# Patient Record
Sex: FEMALE | Race: WHITE | NOT HISPANIC OR LATINO | Employment: OTHER | URBAN - METROPOLITAN AREA
[De-identification: names, ages, dates, MRNs, and addresses within clinical notes are randomized per-mention and may not be internally consistent; named-entity substitution may affect disease eponyms.]

---

## 2023-02-16 PROBLEM — R07.89 DISCOMFORT OF CHEST WALL: Status: ACTIVE | Noted: 2023-02-16

## 2023-02-16 PROBLEM — L98.9 SKIN LESION OF BACK: Status: ACTIVE | Noted: 2023-02-16

## 2023-02-16 PROBLEM — M70.31 BURSITIS OF RIGHT ELBOW: Status: ACTIVE | Noted: 2023-02-16

## 2023-02-16 PROBLEM — M79.672 FOOT PAIN, BILATERAL: Status: ACTIVE | Noted: 2023-02-16

## 2023-02-16 PROBLEM — B35.1 ONYCHOMYCOSIS: Status: ACTIVE | Noted: 2023-02-16

## 2023-02-16 PROBLEM — R53.1 WEAKNESS: Status: ACTIVE | Noted: 2023-02-16

## 2023-02-16 PROBLEM — C44.311 BASAL CELL CARCINOMA OF NOSE: Status: ACTIVE | Noted: 2023-02-16

## 2023-02-16 PROBLEM — B35.3 TINEA PEDIS: Status: ACTIVE | Noted: 2023-02-16

## 2023-02-16 PROBLEM — L60.0 ONYCHOCRYPTOSIS: Status: ACTIVE | Noted: 2023-02-16

## 2023-02-16 PROBLEM — L30.9 DERMATITIS: Status: ACTIVE | Noted: 2023-02-16

## 2023-02-16 PROBLEM — M25.561 PAIN IN JOINT INVOLVING RIGHT LOWER LEG: Status: ACTIVE | Noted: 2023-02-16

## 2023-02-16 PROBLEM — K64.4 EXTERNAL HEMORRHOID: Status: ACTIVE | Noted: 2023-02-16

## 2023-02-16 PROBLEM — R09.81 NASAL SINUS CONGESTION: Status: ACTIVE | Noted: 2023-02-16

## 2023-02-16 PROBLEM — E03.9 HYPOTHYROIDISM: Status: ACTIVE | Noted: 2023-02-16

## 2023-02-16 PROBLEM — R06.2 WHEEZING: Status: ACTIVE | Noted: 2023-02-16

## 2023-02-16 PROBLEM — L08.9 SKIN INFECTION: Status: ACTIVE | Noted: 2023-02-16

## 2023-02-16 PROBLEM — Z86.79 HISTORY OF VENTRICULAR TACHYCARDIA: Status: ACTIVE | Noted: 2023-02-16

## 2023-02-16 PROBLEM — M25.649 STIFFNESS OF HAND JOINT: Status: ACTIVE | Noted: 2023-02-16

## 2023-02-16 PROBLEM — E78.5 HYPERLIPIDEMIA: Status: ACTIVE | Noted: 2023-02-16

## 2023-02-16 PROBLEM — H61.21 IMPACTED CERUMEN OF RIGHT EAR: Status: ACTIVE | Noted: 2023-02-16

## 2023-02-16 PROBLEM — R19.4 CHANGE IN BOWEL HABITS: Status: ACTIVE | Noted: 2023-02-16

## 2023-02-16 PROBLEM — L03.90 CELLULITIS: Status: ACTIVE | Noted: 2023-02-16

## 2023-02-16 PROBLEM — J01.90 ACUTE BACTERIAL SINUSITIS: Status: ACTIVE | Noted: 2023-02-16

## 2023-02-16 PROBLEM — M20.40 HAMMER TOE: Status: ACTIVE | Noted: 2023-02-16

## 2023-02-16 PROBLEM — M25.561 CHRONIC PAIN OF BOTH KNEES: Status: ACTIVE | Noted: 2023-02-16

## 2023-02-16 PROBLEM — H61.20 CERUMEN IMPACTION: Status: ACTIVE | Noted: 2023-02-16

## 2023-02-16 PROBLEM — R10.9 ABDOMINAL PAIN: Status: ACTIVE | Noted: 2023-02-16

## 2023-02-16 PROBLEM — B96.89 ACUTE BACTERIAL SINUSITIS: Status: ACTIVE | Noted: 2023-02-16

## 2023-02-16 PROBLEM — M79.671 FOOT PAIN, BILATERAL: Status: ACTIVE | Noted: 2023-02-16

## 2023-02-16 PROBLEM — M85.80 OSTEOPENIA: Status: ACTIVE | Noted: 2023-02-16

## 2023-02-16 PROBLEM — H61.23 BILATERAL IMPACTED CERUMEN: Status: ACTIVE | Noted: 2023-02-16

## 2023-02-16 PROBLEM — R07.81 RIB PAIN ON RIGHT SIDE: Status: ACTIVE | Noted: 2023-02-16

## 2023-02-16 PROBLEM — R03.0 ELEVATED BLOOD PRESSURE READING: Status: ACTIVE | Noted: 2023-02-16

## 2023-02-16 PROBLEM — G89.29 CHRONIC PAIN OF BOTH KNEES: Status: ACTIVE | Noted: 2023-02-16

## 2023-02-16 PROBLEM — M25.562 CHRONIC PAIN OF BOTH KNEES: Status: ACTIVE | Noted: 2023-02-16

## 2023-02-16 PROBLEM — C44.90 SKIN CANCER: Status: ACTIVE | Noted: 2023-02-16

## 2023-02-16 PROBLEM — F41.9 ANXIETY: Status: ACTIVE | Noted: 2023-02-16

## 2023-02-16 PROBLEM — L85.3 XEROSIS CUTIS: Status: ACTIVE | Noted: 2023-02-16

## 2023-02-16 PROBLEM — R92.8 ABNORMAL MAMMOGRAM: Status: ACTIVE | Noted: 2023-02-16
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
## 2023-02-16 PROBLEM — M79.673 PAIN IN FOOT: Status: ACTIVE | Noted: 2023-02-16

## 2023-02-16 PROBLEM — M67.471 GANGLION CYST OF RIGHT FOOT: Status: ACTIVE | Noted: 2023-02-16

## 2023-02-16 PROBLEM — E55.9 VITAMIN D DEFICIENCY: Status: ACTIVE | Noted: 2023-02-16

## 2023-02-16 PROBLEM — J01.90 ACUTE SINUSITIS: Status: ACTIVE | Noted: 2023-02-16

## 2023-02-16 PROBLEM — R73.9 ELEVATED BLOOD SUGAR LEVEL: Status: ACTIVE | Noted: 2023-02-16

## 2023-02-16 PROBLEM — M54.32 SCIATICA OF LEFT SIDE: Status: ACTIVE | Noted: 2023-02-16

## 2023-02-16 PROBLEM — N63.11 MASS OF UPPER OUTER QUADRANT OF RIGHT BREAST: Status: ACTIVE | Noted: 2023-02-16

## 2023-02-16 PROBLEM — L60.1 ONYCHOLYSIS: Status: ACTIVE | Noted: 2023-02-16

## 2023-02-16 PROBLEM — R07.89 LEFT-SIDED CHEST WALL PAIN: Status: ACTIVE | Noted: 2023-02-16

## 2023-02-16 PROBLEM — R14.0 ABDOMINAL BLOATING: Status: ACTIVE | Noted: 2023-02-16

## 2023-02-16 PROBLEM — L60.8 SPLINTER HEMORRHAGE: Status: ACTIVE | Noted: 2023-02-16

## 2023-02-16 PROBLEM — S80.11XA CONTUSION OF RIGHT LOWER LEG: Status: ACTIVE | Noted: 2023-02-16

## 2023-02-16 PROBLEM — R53.83 FATIGUE: Status: ACTIVE | Noted: 2023-02-16

## 2023-02-16 PROBLEM — R94.31 ABNORMAL EKG: Status: ACTIVE | Noted: 2023-02-16

## 2023-02-16 PROBLEM — C18.9 COLON ADENOCARCINOMA (MULTI): Status: ACTIVE | Noted: 2023-02-16

## 2023-02-16 PROBLEM — M54.2 NECK PAIN: Status: ACTIVE | Noted: 2023-02-16

## 2023-02-16 PROBLEM — D36.9 TUBULAR ADENOMA: Status: ACTIVE | Noted: 2023-02-16

## 2023-02-16 PROBLEM — G62.9 PERIPHERAL NEUROPATHY: Status: ACTIVE | Noted: 2023-02-16

## 2023-02-16 PROBLEM — R07.81 RIB PAIN ON LEFT SIDE: Status: ACTIVE | Noted: 2023-02-16

## 2023-02-16 PROBLEM — L40.9 PSORIASIS: Status: ACTIVE | Noted: 2023-02-16

## 2023-02-16 RX ORDER — AMMONIUM LACTATE 12 G/100G
LOTION TOPICAL
COMMUNITY
Start: 2022-02-15

## 2023-02-16 RX ORDER — ATORVASTATIN CALCIUM 40 MG/1
1 TABLET, FILM COATED ORAL DAILY
COMMUNITY
Start: 2014-08-13 | End: 2023-08-29 | Stop reason: SDUPTHER

## 2023-02-16 RX ORDER — GLUCOSAMINE/CHONDROITIN/C/MANG 500-400 MG
CAPSULE ORAL
COMMUNITY
Start: 2020-10-08

## 2023-02-16 RX ORDER — ACETAMINOPHEN 500 MG
TABLET ORAL
COMMUNITY

## 2023-02-16 RX ORDER — MELOXICAM 15 MG/1
TABLET ORAL AS NEEDED
COMMUNITY
Start: 2022-04-12 | End: 2023-05-03

## 2023-02-16 RX ORDER — SOD SULF/POT CHLORIDE/MAG SULF 1.479 G
TABLET ORAL
COMMUNITY
Start: 2022-06-27 | End: 2023-05-03

## 2023-02-16 RX ORDER — LEVOTHYROXINE SODIUM 112 UG/1
1 TABLET ORAL DAILY
COMMUNITY
Start: 2016-09-08 | End: 2023-12-19 | Stop reason: SDUPTHER

## 2023-02-16 RX ORDER — CHOLECALCIFEROL (VITAMIN D3) 50 MCG
1 TABLET ORAL DAILY
COMMUNITY
Start: 2018-11-13 | End: 2023-05-03

## 2023-03-29 ENCOUNTER — APPOINTMENT (OUTPATIENT)
Dept: PRIMARY CARE | Facility: CLINIC | Age: 73
End: 2023-03-29
Payer: MEDICARE

## 2023-05-03 ENCOUNTER — OFFICE VISIT (OUTPATIENT)
Dept: PRIMARY CARE | Facility: CLINIC | Age: 73
End: 2023-05-03
Payer: MEDICARE

## 2023-05-03 VITALS
DIASTOLIC BLOOD PRESSURE: 82 MMHG | SYSTOLIC BLOOD PRESSURE: 138 MMHG | OXYGEN SATURATION: 97 % | HEART RATE: 103 BPM | BODY MASS INDEX: 29.69 KG/M2 | RESPIRATION RATE: 18 BRPM | TEMPERATURE: 97.3 F | WEIGHT: 152 LBS

## 2023-05-03 DIAGNOSIS — H92.01 EAR PAIN, RIGHT: Primary | ICD-10-CM

## 2023-05-03 DIAGNOSIS — H61.23 EXCESSIVE CERUMEN IN BOTH EAR CANALS: ICD-10-CM

## 2023-05-03 PROCEDURE — 1159F MED LIST DOCD IN RCRD: CPT | Performed by: INTERNAL MEDICINE

## 2023-05-03 PROCEDURE — 1036F TOBACCO NON-USER: CPT | Performed by: INTERNAL MEDICINE

## 2023-05-03 PROCEDURE — 99213 OFFICE O/P EST LOW 20 MIN: CPT | Performed by: INTERNAL MEDICINE

## 2023-05-03 ASSESSMENT — PATIENT HEALTH QUESTIONNAIRE - PHQ9
SUM OF ALL RESPONSES TO PHQ9 QUESTIONS 1 AND 2: 0
2. FEELING DOWN, DEPRESSED OR HOPELESS: NOT AT ALL
1. LITTLE INTEREST OR PLEASURE IN DOING THINGS: NOT AT ALL

## 2023-05-03 ASSESSMENT — PAIN SCALES - GENERAL: PAINLEVEL: 0-NO PAIN

## 2023-05-03 NOTE — PATIENT INSTRUCTIONS
Plan   Pl irrigate R ear for wax in it. - checked after irrigation . Only slight wax came out. She will try with ear wax removal drops for 5 more days.   Consider ENT ref if symptoms persist after that  Patient felt satisfied with plan  F/U sept for Medicare wellness.

## 2023-05-03 NOTE — PROGRESS NOTES
My nurse note reviewed. Patient is here for:  ESV (Patient voiced c/o right ear pain off and on for several months.)       C/o pain in R ear for few months , more so for few weeks. Tried ear wax drops few weeks ago and also used peroxide.   Patient denies any , fever, chills, sore throat, nasal or sinus congestion or cough . No chest pain or shortness of breath.    OBJECTIVE :  /82 (BP Location: Left arm, Patient Position: Sitting)   Pulse 103   Temp 36.3 °C (97.3 °F)   Resp 18   Wt 68.9 kg (152 lb)   SpO2 97%   BMI 29.69 kg/m²   There is soft wax in both ears, no redness noted . TM is not visualized due to wax.   Neck exam didnot reveal any cervical LN enlargement  Passive neck movements were normal. No pain over TMJ noted    Assessment:  Ear pain   Cerumen ears     Plan   Pl irrigate R ear for wax in it. - checked after irrigation . Only slight wax came out. She will try with ear wax removal drops for 5 more days.   Consider ENT ref if symptoms persist after that  Patient felt satisfied with plan  F/U sept for Medicare wellness.

## 2023-05-17 DIAGNOSIS — H92.09 OTALGIA, UNSPECIFIED LATERALITY: ICD-10-CM

## 2023-05-17 DIAGNOSIS — H61.21 IMPACTED CERUMEN OF RIGHT EAR: Primary | ICD-10-CM

## 2023-08-24 PROBLEM — H92.01 RIGHT EAR PAIN: Status: ACTIVE | Noted: 2023-08-24

## 2023-08-24 PROBLEM — L29.9 EAR ITCHING: Status: ACTIVE | Noted: 2023-08-24

## 2023-08-24 PROBLEM — M25.562 ACUTE PAIN OF LEFT KNEE: Status: ACTIVE | Noted: 2023-08-24

## 2023-08-24 PROBLEM — R20.0 NUMBNESS OF TOES: Status: ACTIVE | Noted: 2023-08-24

## 2023-08-24 PROBLEM — H61.22 IMPACTED CERUMEN OF LEFT EAR: Status: ACTIVE | Noted: 2023-08-24

## 2023-08-29 ENCOUNTER — OFFICE VISIT (OUTPATIENT)
Dept: PRIMARY CARE | Facility: CLINIC | Age: 73
End: 2023-08-29
Payer: MEDICARE

## 2023-08-29 VITALS
RESPIRATION RATE: 18 BRPM | BODY MASS INDEX: 29.64 KG/M2 | WEIGHT: 151 LBS | SYSTOLIC BLOOD PRESSURE: 138 MMHG | TEMPERATURE: 96.6 F | HEART RATE: 87 BPM | DIASTOLIC BLOOD PRESSURE: 76 MMHG | OXYGEN SATURATION: 98 % | HEIGHT: 60 IN

## 2023-08-29 DIAGNOSIS — E03.9 HYPOTHYROIDISM, UNSPECIFIED TYPE: ICD-10-CM

## 2023-08-29 DIAGNOSIS — E78.5 HYPERLIPIDEMIA, UNSPECIFIED HYPERLIPIDEMIA TYPE: Primary | ICD-10-CM

## 2023-08-29 PROCEDURE — 99213 OFFICE O/P EST LOW 20 MIN: CPT | Performed by: INTERNAL MEDICINE

## 2023-08-29 PROCEDURE — 1159F MED LIST DOCD IN RCRD: CPT | Performed by: INTERNAL MEDICINE

## 2023-08-29 PROCEDURE — 1036F TOBACCO NON-USER: CPT | Performed by: INTERNAL MEDICINE

## 2023-08-29 PROCEDURE — 1170F FXNL STATUS ASSESSED: CPT | Performed by: INTERNAL MEDICINE

## 2023-08-29 PROCEDURE — 1126F AMNT PAIN NOTED NONE PRSNT: CPT | Performed by: INTERNAL MEDICINE

## 2023-08-29 RX ORDER — ATORVASTATIN CALCIUM 40 MG/1
40 TABLET, FILM COATED ORAL DAILY
Qty: 90 TABLET | Refills: 3 | Status: SHIPPED | OUTPATIENT
Start: 2023-08-29

## 2023-08-29 ASSESSMENT — ACTIVITIES OF DAILY LIVING (ADL)
DOING_HOUSEWORK: INDEPENDENT
MANAGING_FINANCES: INDEPENDENT
GROCERY_SHOPPING: INDEPENDENT
BATHING: INDEPENDENT
DRESSING: INDEPENDENT
TAKING_MEDICATION: INDEPENDENT

## 2023-08-29 ASSESSMENT — PATIENT HEALTH QUESTIONNAIRE - PHQ9
1. LITTLE INTEREST OR PLEASURE IN DOING THINGS: NOT AT ALL
SUM OF ALL RESPONSES TO PHQ9 QUESTIONS 1 AND 2: 0
2. FEELING DOWN, DEPRESSED OR HOPELESS: NOT AT ALL

## 2023-08-29 ASSESSMENT — PAIN SCALES - GENERAL: PAINLEVEL: 0-NO PAIN

## 2023-08-29 NOTE — PROGRESS NOTES
My nurse note reviewed. Patient is here for:  For f/U on thyroid, HLD    Patient denies any shortness of breath, PND, orthopnea, chest pain , palpitation, syncope or edema in legs  Taking meds ok     Needs refill  OBJECTIVE :/76 (BP Location: Left arm, Patient Position: Sitting, BP Cuff Size: Adult)   Pulse 87   Temp 35.9 °C (96.6 °F)   Resp 18   Ht 1.524 m (5')   Wt 68.5 kg (151 lb)   SpO2 98%   BMI 29.49 kg/m²   CVS: S1 S2 + , no S3. No loud heart murmur appreciated. Lungs clear, No edema  CNS: Patient is alert, oriented moving all 4 extremities well. No motor weakness noted on gross neurological exam. No involuntary movements or tremors noted.    Assessment:  1. Hyperlipidemia, unspecified hyperlipidemia type - refill done   2. Hypothyroidism, unspecified type -hx of. On med     Plan   Current medications are effective. advised to continue current medications.  Requested prescription/s refill done to the pharmacy of patient choice .  Schedule for Medicare wellness on Sept 8th , Friday .   Patient felt satisfied with plan

## 2023-08-29 NOTE — PATIENT INSTRUCTIONS
Plan   Current medications are effective. advised to continue current medications.  Requested prescription/s refill done to the pharmacy of patient choice .  Schedule for Medicare wellness on Sept 8th , Friday .   Patient felt satisfied with plan

## 2023-09-08 ENCOUNTER — OFFICE VISIT (OUTPATIENT)
Dept: PRIMARY CARE | Facility: CLINIC | Age: 73
End: 2023-09-08
Payer: MEDICARE

## 2023-09-08 VITALS
OXYGEN SATURATION: 96 % | HEART RATE: 96 BPM | BODY MASS INDEX: 29.25 KG/M2 | HEIGHT: 60 IN | WEIGHT: 149 LBS | TEMPERATURE: 95.9 F | DIASTOLIC BLOOD PRESSURE: 78 MMHG | RESPIRATION RATE: 18 BRPM | SYSTOLIC BLOOD PRESSURE: 126 MMHG

## 2023-09-08 DIAGNOSIS — Z13.6 SCREENING FOR CARDIOVASCULAR CONDITION: ICD-10-CM

## 2023-09-08 DIAGNOSIS — Z23 ENCOUNTER FOR IMMUNIZATION: ICD-10-CM

## 2023-09-08 DIAGNOSIS — Z78.0 POSTMENOPAUSAL: ICD-10-CM

## 2023-09-08 DIAGNOSIS — Z13.39 ALCOHOL SCREENING: ICD-10-CM

## 2023-09-08 DIAGNOSIS — H10.31 ACUTE CONJUNCTIVITIS OF RIGHT EYE, UNSPECIFIED ACUTE CONJUNCTIVITIS TYPE: ICD-10-CM

## 2023-09-08 DIAGNOSIS — Z13.31 DEPRESSION SCREEN: ICD-10-CM

## 2023-09-08 DIAGNOSIS — Z00.00 MEDICARE ANNUAL WELLNESS VISIT, SUBSEQUENT: Primary | ICD-10-CM

## 2023-09-08 PROCEDURE — 1036F TOBACCO NON-USER: CPT | Performed by: INTERNAL MEDICINE

## 2023-09-08 PROCEDURE — 1170F FXNL STATUS ASSESSED: CPT | Performed by: INTERNAL MEDICINE

## 2023-09-08 PROCEDURE — G0008 ADMIN INFLUENZA VIRUS VAC: HCPCS | Performed by: INTERNAL MEDICINE

## 2023-09-08 PROCEDURE — G0446 INTENS BEHAVE THER CARDIO DX: HCPCS | Performed by: INTERNAL MEDICINE

## 2023-09-08 PROCEDURE — G0442 ANNUAL ALCOHOL SCREEN 15 MIN: HCPCS | Performed by: INTERNAL MEDICINE

## 2023-09-08 PROCEDURE — 99213 OFFICE O/P EST LOW 20 MIN: CPT | Performed by: INTERNAL MEDICINE

## 2023-09-08 PROCEDURE — G0439 PPPS, SUBSEQ VISIT: HCPCS | Performed by: INTERNAL MEDICINE

## 2023-09-08 PROCEDURE — 90715 TDAP VACCINE 7 YRS/> IM: CPT | Performed by: INTERNAL MEDICINE

## 2023-09-08 PROCEDURE — 90662 IIV NO PRSV INCREASED AG IM: CPT | Performed by: INTERNAL MEDICINE

## 2023-09-08 PROCEDURE — 1159F MED LIST DOCD IN RCRD: CPT | Performed by: INTERNAL MEDICINE

## 2023-09-08 PROCEDURE — 99497 ADVNCD CARE PLAN 30 MIN: CPT | Performed by: INTERNAL MEDICINE

## 2023-09-08 PROCEDURE — 1126F AMNT PAIN NOTED NONE PRSNT: CPT | Performed by: INTERNAL MEDICINE

## 2023-09-08 PROCEDURE — G0444 DEPRESSION SCREEN ANNUAL: HCPCS | Performed by: INTERNAL MEDICINE

## 2023-09-08 RX ORDER — TOBRAMYCIN 3 MG/ML
2 SOLUTION/ DROPS OPHTHALMIC 2 TIMES DAILY
Qty: 1.4 ML | Refills: 0 | Status: SHIPPED | OUTPATIENT
Start: 2023-09-08 | End: 2023-09-15

## 2023-09-08 ASSESSMENT — PATIENT HEALTH QUESTIONNAIRE - PHQ9
SUM OF ALL RESPONSES TO PHQ9 QUESTIONS 1 AND 2: 0
1. LITTLE INTEREST OR PLEASURE IN DOING THINGS: NOT AT ALL
2. FEELING DOWN, DEPRESSED OR HOPELESS: NOT AT ALL

## 2023-09-08 ASSESSMENT — ACTIVITIES OF DAILY LIVING (ADL)
MANAGING_FINANCES: INDEPENDENT
BATHING: INDEPENDENT
DRESSING: INDEPENDENT
GROCERY_SHOPPING: INDEPENDENT
DOING_HOUSEWORK: INDEPENDENT
TAKING_MEDICATION: INDEPENDENT

## 2023-09-08 ASSESSMENT — PAIN SCALES - GENERAL: PAINLEVEL: 0-NO PAIN

## 2023-09-08 NOTE — PROGRESS NOTES
My nurse note reviewed. Patient is here for:  Medicare Annual Wellness Visit Subsequent (Patient voiced c/o of stye to right eye that has been there for about a month.)     Also wants R eye issue to be addressed, it is red , itchy and gets some pus like discharge in morning . Also sticky in morning     Over the past 2 weeks, how often have you been bothered by any of the following problems?  Little interest or pleasure in doing things: Not at all  Feeling down, depressed, or hopeless: Not at all  Functional Ability/Level of Safety  Cognitive Impairment Observed: No cognitive impairment observed  Nutrition and Exercise  Current Diet: Well Balanced Diet  Adequate Fluid Intake: Yes  Caffeine: Yes  Exercise Frequency: Regularly  IADL's  Grocery Shopping: Independent  Doing Housework: Independent  Taking Medication: Independent  Managing Finances: Independent  [place] Home Safety Risk Factors  Home Safety Risk Factors: None     Patient denies any shortness of breath, PND, orthopnea, chest pain , palpitation, syncope or edema in legs  patient denies any abdominal pain, tenderness, nausea, vomiting, change in bowel habits or blood in stool.  Patient denies any weakness in extremities.. Denies any headache, visual symptoms , speech problems or  tremors . No TIA or stroke like symptoms..   Taking meds ok     During Medicare wellness apart from looking at assessment done by nurse,  I also asked following :    Alcohol use : Alcohol screening  was  done during this visit. Patient is not having any issue with increase alcohol use . No ETOH abuse was observed by history . Occ drinks wine    HIV test: patient was not found to be high risk for HIV     Cognitive issue : None     Advanced Directive: Patient has advanced directive including living will and Health care power of . Health POA is daughter Brenda . All questions related to it answered. Total time > 16 min.     I have reviewed all active medications patient is  currently on . Questions related to medication answered to patient's satisfaction.    OBJECTIVE :  /78 (BP Location: Left arm, Patient Position: Sitting, BP Cuff Size: Adult)   Pulse 96   Temp 35.5 °C (95.9 °F)   Resp 18   Ht 1.524 m (5')   Wt 67.6 kg (149 lb)   SpO2 96%   BMI 29.10 kg/m²   R eye conjunctival congestion noted, no pus noted, seems irritated . Eye movements ok   CVS: S1 S2 + , no S3. No loud heart murmur appreciated. Lungs clear, No edema      Assessment:  1. Medicare annual wellness visit, subsequent  done      2. Alcohol screening        3. Depression screen        4. Screening for cardiovascular condition        5. Postmenopausal  DEXA      6. Encounter for immunization        7. Acute conjunctivitis of right eye, unspecified acute conjunctivitis type  New rx done      During Medicare wellness patients chronic diagnosis were reviewed and questions related to it answered to patient's satisfaction . Risk factors for heart, stroke were discussed with patient . Discussion about preventative tests were done with patient also . pain issue was discussed as appropriate for patient .  ASCVD score 12.2 %. discussed  Plan  Medicare wellness done  Eye drops prescribed for R eye problem   Warm compress   See eye doctor if not better  TDAP and Flu shot   Bone density test   F/U 6 months and 12 months  for medicare wellness

## 2023-09-08 NOTE — PATIENT INSTRUCTIONS
Plan  Medicare wellness done  Eye drops prescribed for R eye problem   Warm compress   See eye doctor if not better  TDAP and Flu shot   Bone density test   F/U 6 months and 12 months  for medicare wellness

## 2023-12-05 ENCOUNTER — OFFICE VISIT (OUTPATIENT)
Dept: PODIATRY | Facility: CLINIC | Age: 73
End: 2023-12-05
Payer: MEDICARE

## 2023-12-05 DIAGNOSIS — M79.672 FOOT PAIN, BILATERAL: Primary | ICD-10-CM

## 2023-12-05 DIAGNOSIS — B35.1 ONYCHOMYCOSIS: ICD-10-CM

## 2023-12-05 DIAGNOSIS — L60.1 ONYCHOLYSIS: ICD-10-CM

## 2023-12-05 DIAGNOSIS — M79.671 FOOT PAIN, BILATERAL: Primary | ICD-10-CM

## 2023-12-05 DIAGNOSIS — L85.3 XEROSIS CUTIS: ICD-10-CM

## 2023-12-05 PROCEDURE — 99213 OFFICE O/P EST LOW 20 MIN: CPT | Performed by: PODIATRIST

## 2023-12-05 PROCEDURE — 1126F AMNT PAIN NOTED NONE PRSNT: CPT | Performed by: PODIATRIST

## 2023-12-05 PROCEDURE — 1160F RVW MEDS BY RX/DR IN RCRD: CPT | Performed by: PODIATRIST

## 2023-12-05 PROCEDURE — 1159F MED LIST DOCD IN RCRD: CPT | Performed by: PODIATRIST

## 2023-12-05 PROCEDURE — 1036F TOBACCO NON-USER: CPT | Performed by: PODIATRIST

## 2023-12-05 NOTE — PROGRESS NOTES
Chief Complaint   Patient presents with    Nail Care     [id number]    Follow-up bilateral foot pain secondary to nail pathology. Nails are quite thickened difficulty with shoe gear. Several feel ingrown. Baseline complaint. No other new problems.no changes past medical history. No changes in review of systems.      Constitutional: Patient alert. No acute distress.  Psychiatric: Normal mood and affect.  HEENT: Sclera clear. Wearing glasses  Respiratory: Breathing unlabored.   Dermatologic: Multiple nails are hypertrophic crumbly and yellow. Mild onychocryptosis with the hallux nails without any acute inflammation noted.  Onycholysis distally hallux nails noted.  Painful to palpation. No acute inflammatory infectious process. Mild xerosis cutis noted. These are baseline findings to the patient.  No ulcers are pre-ulcerative areas in either foot.  Web spaces are dry. No tinea pedis.   Pulses intact and symmetric. Feet warm to touch. No ulcers or pre ulcer areas.   Motor function is grossly intact and symmetric.      Impression: Bilateral foot pain secondary to nail pathology. Xerosis cutis.     -The current clinical findings, treatment course, and current plan were discussed with the patient in detail. Various questions were answered at that time. If there is any interval changes or new problems the patient will advise. This dictation was done using voice recognition software and may contain some grammatical errors.      -Nail debridement was performed this was a greater than 6 nails. Nails were manually debrided. They were decreased and girth in length. Appropriate care was discussed. Preventative care was reviewed. Follow-up in about 2 months unless there is any other problems.     -Review also seasonal foot care.

## 2023-12-19 ENCOUNTER — OFFICE VISIT (OUTPATIENT)
Dept: PRIMARY CARE | Facility: CLINIC | Age: 73
End: 2023-12-19
Payer: MEDICARE

## 2023-12-19 ENCOUNTER — HOSPITAL ENCOUNTER (OUTPATIENT)
Dept: RADIOLOGY | Facility: HOSPITAL | Age: 73
Discharge: HOME | End: 2023-12-19
Payer: MEDICARE

## 2023-12-19 VITALS
OXYGEN SATURATION: 98 % | DIASTOLIC BLOOD PRESSURE: 72 MMHG | SYSTOLIC BLOOD PRESSURE: 154 MMHG | BODY MASS INDEX: 28.9 KG/M2 | WEIGHT: 148 LBS | HEART RATE: 105 BPM | TEMPERATURE: 96.1 F

## 2023-12-19 DIAGNOSIS — R10.31 RIGHT INGUINAL PAIN: ICD-10-CM

## 2023-12-19 DIAGNOSIS — C18.4 MALIGNANT NEOPLASM OF TRANSVERSE COLON (MULTI): ICD-10-CM

## 2023-12-19 DIAGNOSIS — R10.31 RIGHT INGUINAL PAIN: Primary | ICD-10-CM

## 2023-12-19 DIAGNOSIS — E03.9 HYPOTHYROIDISM, UNSPECIFIED TYPE: ICD-10-CM

## 2023-12-19 DIAGNOSIS — R03.0 ELEVATED BLOOD PRESSURE READING: ICD-10-CM

## 2023-12-19 PROCEDURE — 1126F AMNT PAIN NOTED NONE PRSNT: CPT | Performed by: INTERNAL MEDICINE

## 2023-12-19 PROCEDURE — 99214 OFFICE O/P EST MOD 30 MIN: CPT | Performed by: INTERNAL MEDICINE

## 2023-12-19 PROCEDURE — 1160F RVW MEDS BY RX/DR IN RCRD: CPT | Performed by: INTERNAL MEDICINE

## 2023-12-19 PROCEDURE — 1036F TOBACCO NON-USER: CPT | Performed by: INTERNAL MEDICINE

## 2023-12-19 PROCEDURE — 73502 X-RAY EXAM HIP UNI 2-3 VIEWS: CPT | Mod: RT,FY

## 2023-12-19 PROCEDURE — 73502 X-RAY EXAM HIP UNI 2-3 VIEWS: CPT | Mod: RIGHT SIDE | Performed by: RADIOLOGY

## 2023-12-19 PROCEDURE — 1159F MED LIST DOCD IN RCRD: CPT | Performed by: INTERNAL MEDICINE

## 2023-12-19 RX ORDER — LEVOTHYROXINE SODIUM 112 UG/1
112 TABLET ORAL DAILY
Qty: 90 TABLET | Refills: 3 | Status: CANCELLED | OUTPATIENT
Start: 2023-12-19

## 2023-12-19 RX ORDER — LEVOTHYROXINE SODIUM 112 UG/1
112 TABLET ORAL
Qty: 90 TABLET | Refills: 3 | Status: SHIPPED | OUTPATIENT
Start: 2023-12-19

## 2023-12-19 RX ORDER — MELOXICAM 15 MG/1
15 TABLET ORAL DAILY
Qty: 30 TABLET | Refills: 1 | Status: SHIPPED | OUTPATIENT
Start: 2023-12-19 | End: 2024-12-18

## 2023-12-19 ASSESSMENT — PATIENT HEALTH QUESTIONNAIRE - PHQ9
2. FEELING DOWN, DEPRESSED OR HOPELESS: NOT AT ALL
1. LITTLE INTEREST OR PLEASURE IN DOING THINGS: NOT AT ALL
SUM OF ALL RESPONSES TO PHQ9 QUESTIONS 1 AND 2: 0

## 2023-12-19 NOTE — PROGRESS NOTES
My nurse note reviewed. Patient is here for:  Pain (Right sided pain /Sensation in left leg)   Pt is here for new issue with R groin pian and sometimes in L thigh pain .   patient denies any abdominal pain, tenderness, nausea, vomiting, change in bowel habits or blood in stool.  Hx of colon CA, sees surgeon, last colonoscopy last yr , reviewed and discussed   No fall or injury   Worried about bones   Needs refill    OBJECTIVE :  /72   Pulse 105   Temp 35.6 °C (96.1 °F)   Wt 67.1 kg (148 lb)   SpO2 98%   BMI 28.90 kg/m²   Bp mildly elevated ? Due to pain   R hip movements ok   Some pain near R groin, no inguinal hernia felt, abd soft , no rebound tenderness   No pain in L leg at present       Assessment:  1. Right inguinal pain ? Hip related or other MSK pain . R/o other etiology    2. Malignant neoplasm of transverse colon (CMS/HCC) -hx of. Upto date on colonoscopy   3. Hypothyroidism, unspecified type -hx of. Refill done   4 elevated BP - will monitor it during future visit   Plan  Xray of hip ordered  Discussed causes of groin pain, questions answered  New prescription done for pain   Requested prescription/s refill done to the pharmacy of patient choice .  F/U in March or earlier if symptoms persist

## 2023-12-19 NOTE — PATIENT INSTRUCTIONS
Plan  Xray of hip ordered  Discussed causes of groin pain, questions answered  New prescription done for pain   Requested prescription/s refill done to the pharmacy of patient choice .  F/U in March or earlier if symptoms persist

## 2023-12-27 ENCOUNTER — ANCILLARY PROCEDURE (OUTPATIENT)
Dept: RADIOLOGY | Facility: CLINIC | Age: 73
End: 2023-12-27
Payer: MEDICARE

## 2023-12-27 ENCOUNTER — TELEPHONE (OUTPATIENT)
Dept: PRIMARY CARE | Facility: CLINIC | Age: 73
End: 2023-12-27

## 2023-12-27 DIAGNOSIS — Z78.0 ASYMPTOMATIC MENOPAUSAL STATE: ICD-10-CM

## 2023-12-27 DIAGNOSIS — Z12.31 ENCOUNTER FOR SCREENING MAMMOGRAM FOR MALIGNANT NEOPLASM OF BREAST: ICD-10-CM

## 2023-12-27 PROCEDURE — 77063 BREAST TOMOSYNTHESIS BI: CPT | Mod: BILATERAL PROCEDURE | Performed by: RADIOLOGY

## 2023-12-27 PROCEDURE — 77080 DXA BONE DENSITY AXIAL: CPT

## 2023-12-27 PROCEDURE — 77080 DXA BONE DENSITY AXIAL: CPT | Performed by: RADIOLOGY

## 2023-12-27 PROCEDURE — 77063 BREAST TOMOSYNTHESIS BI: CPT

## 2023-12-27 PROCEDURE — 77067 SCR MAMMO BI INCL CAD: CPT | Mod: BILATERAL PROCEDURE | Performed by: RADIOLOGY

## 2023-12-27 NOTE — PROGRESS NOTES
Patient notified of bone density results . Patient would also like mammogram and pelvis x-ray results.

## 2024-01-31 ENCOUNTER — OFFICE VISIT (OUTPATIENT)
Dept: SURGERY | Facility: CLINIC | Age: 74
End: 2024-01-31
Payer: MEDICARE

## 2024-01-31 VITALS
BODY MASS INDEX: 28.47 KG/M2 | DIASTOLIC BLOOD PRESSURE: 80 MMHG | SYSTOLIC BLOOD PRESSURE: 140 MMHG | WEIGHT: 145 LBS | TEMPERATURE: 98.4 F | HEIGHT: 60 IN | HEART RATE: 88 BPM

## 2024-01-31 DIAGNOSIS — C18.4 ADENOCARCINOMA OF TRANSVERSE COLON (MULTI): Primary | ICD-10-CM

## 2024-01-31 PROCEDURE — 99214 OFFICE O/P EST MOD 30 MIN: CPT | Performed by: SURGERY

## 2024-01-31 PROCEDURE — 1159F MED LIST DOCD IN RCRD: CPT | Performed by: SURGERY

## 2024-01-31 PROCEDURE — 1036F TOBACCO NON-USER: CPT | Performed by: SURGERY

## 2024-01-31 PROCEDURE — 1126F AMNT PAIN NOTED NONE PRSNT: CPT | Performed by: SURGERY

## 2024-01-31 NOTE — PROGRESS NOTES
History Of Present Illness :  Earlene Herr is a 73 y.o. female who presents for 6-month colon cancer follow-up.  Patient was last seen by me on 7/19/2023.  Please see the note(s) in legacy  [employer] for details.    Patient notes some chronic intermittent achiness and burning in the right lower quadrant of uncertain etiology.  This appears to be near a trocar site.  This is very intermittent and does not lifestyle changing.  Otherwise no other abdominal pain or GI symptoms.  She is noted no issues with regard to the umbilicus.      The  patient's most recent colonoscopy was on 8/17/2022 and was overall satisfactory.  A small hyperplastic polyp was noted in the descending colon.  Next colonoscopy recommended in 3 years per Dr. Mackay.      Patient lives in [place].  She is a retired .  The patient is a .  Her  passed away at home from an acute cardiac event in December 2022.    Past Medical History   Past Medical History:   Diagnosis Date    Basal cell carcinoma of skin of other part of trunk 09/15/2015    Basal cell carcinoma of flank    Dorsalgia, unspecified 09/15/2015    Chronic back pain    Encounter for gynecological examination (general) (routine) without abnormal findings     Pap smear, as part of routine gynecological examination    Pain in unspecified foot 05/19/2016    Foot pain    Person consulting for explanation of examination or test findings     Visit for review of DEXA scan    Personal history of other diseases of the musculoskeletal system and connective tissue 08/02/2017    History of osteopenia    Personal history of other endocrine, nutritional and metabolic disease 09/15/2015    History of thyroid disease    Personal history of other medical treatment     History of mammogram    Personal history of other specified (corrected) congenital malformations of nervous system and sense organs 09/15/2015    History of spina bifida    Pure hypercholesterolemia, unspecified  09/15/2015    High cholesterol        Surgical History    Past Surgical History:   Procedure Laterality Date    BREAST BIOPSY Right 10/2019    Benign right core     SECTION, CLASSIC  09/15/2015     Section    HYSTERECTOMY  09/15/2015    Hysterectomy    NOSE SURGERY  2017    Nose Surgery        Allergies   Allergies   Allergen Reactions    Codeine Unknown    Sulfa (Sulfonamide Antibiotics) Unknown    Sulfamethoxazole-Trimethoprim Unknown        Home Meds  Current Outpatient Medications   Medication Instructions    ammonium lactate (Lac-Hydrin) 12 % lotion APPLY AS DIRECTED    atorvastatin (LIPITOR) 40 mg, oral, Daily    calcium carbonate-vitamin D3 600 mg-20 mcg (800 unit) tablet Caltrate 600 Plus-Vit D TABS   Refills: 0       Active    glucosamine-chondroit-vit C-Mn 500-400 mg capsule Glucosamine-Chondroitin Oral Capsule   Refills: 0        Start : 8-Oct-2020   Active    hydrocortisone acetate 2.5 % cream with perineal applicator USE AS DIRECTED    levothyroxine (SYNTHROID, LEVOXYL) 112 mcg, oral, Daily before breakfast    meloxicam (MOBIC) 15 mg, oral, Daily, For one week then as needed        Family History    Family History   Problem Relation Name Age of Onset    Bunion Mother      Other (cardiac disorder) Mother      Stroke Mother      Other (thyroid trouble) Mother      Prostate cancer Father      Other (bowel obstruction) Brother      Other (thyroid trouble) Brother          Social History  Social History     Tobacco Use    Smoking status: Never    Smokeless tobacco: Never   Substance Use Topics    Alcohol use: Yes     Alcohol/week: 1.0 standard drink of alcohol     Types: 1 Glasses of wine per week     Comment: occassionally        Review Of Systems    Review of Systems     General: Not Present- Obesity, Cancer, HIV, MRSA, Recent Cold/Flu, Tired During the Day and VRE.  HEENT: Not Present- Migraine, Cataracts, Glaucoma, Macular Degeneration and Retinal Detachment.  Respiratory: Not  Present- Asthma, Chronic Cough, Difficulty Breathing on Exertion, Difficulty Breathing at Rest, Emphysema, Frequent Bronchitis, Home CPAP/BiPAP, Home Oxygen, Pulmonary Embolus, Pneumonia/TB, Sleep Apnea and Snoring.  Cardiovascular: Not Present- Chest Pain, Congestive Heart Failure, Heart Attack, Coronary Artery Disease, Heart Stent, Hypertension, Internal Defibrillator, Irregular Heart Beat, Mitral Valve Prolapse, Murmur, Pacemaker and Peripheral Vascular Disease.  Gastrointestinal: Not Present- Heartburn, Hepatitis, Hiatal Hernia, Jaundice, Reflux, Stomach Ulcer and IBS.  Female Genitourinary: Not Present- Kidney Failure, Kidney Stones, Dialysis and Urinary Tract Infection.  Musculoskeletal: Not Present- Arthritis, Back Pain and Fibromyalgia.  Neurological: Not Present- Headaches, Numbness, Tingling, Seizures, Stroke,  Shunt and Weakness.  Psychiatric: Not Present- Bipolar, Depression and Panic Attacks.  Endocrine: Not Present- Diabetes, Hyperthyroidism and Low Blood Sugar.  Hematology: Not Present- Abnormal Bleeding, Anemia and Blood Clots.     Vitals  There were no vitals taken for this visit.     Physical Exam   Abdominal Physical Exam     General  General Appearance - Not in acute distress. Well-developed and well-nourished. Alert and oriented times 3.     Chest and Lung Exam  Auscultation:  Breath sounds: - Normal. Clear and equal bilaterally.  Adventitious sounds: - No Adventitious sounds.     Cardiovascular  Auscultation: Rate and Rhythm - Regular. Heart Sounds - Normal heart sounds. No murmurs.     Abdomen  Inspection: - Inspection Normal.  Palpation/Percussion: Palpation and Percussion of the abdomen reveal - Non Tender, No Rebound tenderness, No Rigidity (guarding) and No Palpable abdominal masses.  Liver: - Normal.  Spleen: - Normal.  Auscultation: Auscultation of the abdomen reveals - Bowel sounds normal and No Abdominal bruits.  Surgical scars: Small midline superior to the umbilicus; old lower  midline scar; laparoscopic x3  There is some minimal weakness at the supraumbilical fascia at the site of her incision -perhaps 2.5 cm in diameter; no clear fascial defect can be palpated; no change from previous     Inguinal  No inguinal or femoral hernias or lymphadenopathy bilaterally.     Rectal  Anorectal Exam: not examined.    Assessment/Plan   Mrs. Herr's clinical follow-up is satisfactory.    Will continue to monitor her abdominal wall particularly the supraumbilical site.  There is some weakness there but no outright hernia or fascial defect.    Diagnosis:    Colon adenocarcinoma, proximal transverse colon  Stage I (T1 N0)  S/p laparoscopic extended ileocolectomy, 11/4/2021, [facility]    Recommendations:    Follow-up in 6 months for recheck    Next colonoscopy August 2025 per Dr. Mackay

## 2024-03-05 ENCOUNTER — APPOINTMENT (OUTPATIENT)
Dept: PRIMARY CARE | Facility: CLINIC | Age: 74
End: 2024-03-05
Payer: MEDICARE

## 2024-03-05 ENCOUNTER — APPOINTMENT (OUTPATIENT)
Dept: PODIATRY | Facility: CLINIC | Age: 74
End: 2024-03-05
Payer: MEDICARE

## 2024-03-27 ENCOUNTER — OFFICE VISIT (OUTPATIENT)
Dept: PRIMARY CARE | Facility: CLINIC | Age: 74
End: 2024-03-27
Payer: MEDICARE

## 2024-03-27 VITALS
SYSTOLIC BLOOD PRESSURE: 128 MMHG | DIASTOLIC BLOOD PRESSURE: 80 MMHG | BODY MASS INDEX: 28.32 KG/M2 | HEART RATE: 88 BPM | WEIGHT: 145 LBS | OXYGEN SATURATION: 96 % | TEMPERATURE: 95.4 F

## 2024-03-27 DIAGNOSIS — E03.9 HYPOTHYROIDISM, UNSPECIFIED TYPE: Primary | ICD-10-CM

## 2024-03-27 DIAGNOSIS — E78.5 HYPERLIPIDEMIA, UNSPECIFIED HYPERLIPIDEMIA TYPE: ICD-10-CM

## 2024-03-27 PROCEDURE — 1159F MED LIST DOCD IN RCRD: CPT | Performed by: INTERNAL MEDICINE

## 2024-03-27 PROCEDURE — 99213 OFFICE O/P EST LOW 20 MIN: CPT | Performed by: INTERNAL MEDICINE

## 2024-03-27 PROCEDURE — 1036F TOBACCO NON-USER: CPT | Performed by: INTERNAL MEDICINE

## 2024-03-27 NOTE — PROGRESS NOTES
My nurse note reviewed. Patient is here for:  Follow-up     Patient denies any shortness of breath, PND, orthopnea, chest pain , palpitation, syncope or edema in legs  Patient denies any weakness in extremities.. Denies any headache, visual symptoms , speech problems or  tremors . No TIA or stroke like symptoms..  Uses claritin for allergies, doing ok with it.    Takes meds ok   I have reviewed all active medications patient is currently on . Questions related to medication answered to patient's satisfaction.  Did not have blood tests last year   OBJECTIVE :  /80   Pulse 88   Temp 35.2 °C (95.4 °F)   Wt 65.8 kg (145 lb)   SpO2 96%   BMI 28.32 kg/m²   CVS: S1 S2 + , no S3. No loud heart murmur appreciated. Lungs clear, No edema  CNS: Patient is alert, oriented moving all 4 extremities well. No motor weakness noted on gross neurological exam. No involuntary movements or tremors noted.    Assessment:  1. Hypothyroidism, unspecified type -on med.    2. Hyperlipidemia, unspecified hyperlipidemia type -hx of .      Plan  Current medications are effective. advised to continue current medications.  Blood tests ordered  Questions related to blood tests answered  F/U in September as scheduled

## 2024-03-27 NOTE — PATIENT INSTRUCTIONS
Plan  Current medications are effective. advised to continue current medications.  Blood tests ordered  Questions related to blood tests answered  F/U in September as scheduled

## 2024-04-09 ENCOUNTER — PROCEDURE VISIT (OUTPATIENT)
Dept: PODIATRY | Facility: CLINIC | Age: 74
End: 2024-04-09
Payer: MEDICARE

## 2024-04-09 ENCOUNTER — LAB (OUTPATIENT)
Dept: LAB | Facility: LAB | Age: 74
End: 2024-04-09
Payer: MEDICARE

## 2024-04-09 DIAGNOSIS — M79.671 FOOT PAIN, BILATERAL: Primary | ICD-10-CM

## 2024-04-09 DIAGNOSIS — R23.8 SKIN BULLA: ICD-10-CM

## 2024-04-09 DIAGNOSIS — E03.9 HYPOTHYROIDISM, UNSPECIFIED TYPE: ICD-10-CM

## 2024-04-09 DIAGNOSIS — L60.1 ONYCHOLYSIS: ICD-10-CM

## 2024-04-09 DIAGNOSIS — E78.5 HYPERLIPIDEMIA, UNSPECIFIED HYPERLIPIDEMIA TYPE: ICD-10-CM

## 2024-04-09 DIAGNOSIS — B35.1 ONYCHOMYCOSIS: ICD-10-CM

## 2024-04-09 DIAGNOSIS — M79.672 FOOT PAIN, BILATERAL: Primary | ICD-10-CM

## 2024-04-09 LAB
ALBUMIN SERPL BCP-MCNC: 4.5 G/DL (ref 3.4–5)
ALP SERPL-CCNC: 67 U/L (ref 33–136)
ALT SERPL W P-5'-P-CCNC: 27 U/L (ref 7–45)
ANION GAP SERPL CALC-SCNC: 11 MMOL/L (ref 10–20)
AST SERPL W P-5'-P-CCNC: 20 U/L (ref 9–39)
BASOPHILS # BLD AUTO: 0.06 X10*3/UL (ref 0–0.1)
BASOPHILS NFR BLD AUTO: 0.8 %
BILIRUB DIRECT SERPL-MCNC: 0.1 MG/DL (ref 0–0.3)
BILIRUB SERPL-MCNC: 0.7 MG/DL (ref 0–1.2)
BUN SERPL-MCNC: 17 MG/DL (ref 6–23)
CALCIUM SERPL-MCNC: 9.9 MG/DL (ref 8.6–10.3)
CHLORIDE SERPL-SCNC: 105 MMOL/L (ref 98–107)
CHOLEST SERPL-MCNC: 151 MG/DL (ref 0–199)
CHOLESTEROL/HDL RATIO: 3.3
CO2 SERPL-SCNC: 31 MMOL/L (ref 21–32)
CREAT SERPL-MCNC: 0.76 MG/DL (ref 0.5–1.05)
EGFRCR SERPLBLD CKD-EPI 2021: 82 ML/MIN/1.73M*2
EOSINOPHIL # BLD AUTO: 0.2 X10*3/UL (ref 0–0.4)
EOSINOPHIL NFR BLD AUTO: 2.6 %
ERYTHROCYTE [DISTWIDTH] IN BLOOD BY AUTOMATED COUNT: 13.2 % (ref 11.5–14.5)
GLUCOSE SERPL-MCNC: 87 MG/DL (ref 74–99)
HCT VFR BLD AUTO: 46.1 % (ref 36–46)
HDLC SERPL-MCNC: 45.8 MG/DL
HGB BLD-MCNC: 15.1 G/DL (ref 12–16)
IMM GRANULOCYTES # BLD AUTO: 0.01 X10*3/UL (ref 0–0.5)
IMM GRANULOCYTES NFR BLD AUTO: 0.1 % (ref 0–0.9)
LYMPHOCYTES # BLD AUTO: 1.7 X10*3/UL (ref 0.8–3)
LYMPHOCYTES NFR BLD AUTO: 21.9 %
MCH RBC QN AUTO: 28.9 PG (ref 26–34)
MCHC RBC AUTO-ENTMCNC: 32.8 G/DL (ref 32–36)
MCV RBC AUTO: 88 FL (ref 80–100)
MONOCYTES # BLD AUTO: 0.55 X10*3/UL (ref 0.05–0.8)
MONOCYTES NFR BLD AUTO: 7.1 %
NEUTROPHILS # BLD AUTO: 5.26 X10*3/UL (ref 1.6–5.5)
NEUTROPHILS NFR BLD AUTO: 67.5 %
NON-HDL CHOLESTEROL: 105 MG/DL (ref 0–149)
NRBC BLD-RTO: 0 /100 WBCS (ref 0–0)
PLATELET # BLD AUTO: 287 X10*3/UL (ref 150–450)
POTASSIUM SERPL-SCNC: 3.8 MMOL/L (ref 3.5–5.3)
PROT SERPL-MCNC: 7.3 G/DL (ref 6.4–8.2)
RBC # BLD AUTO: 5.23 X10*6/UL (ref 4–5.2)
SODIUM SERPL-SCNC: 143 MMOL/L (ref 136–145)
TSH SERPL-ACNC: 0.35 MIU/L (ref 0.44–3.98)
WBC # BLD AUTO: 7.8 X10*3/UL (ref 4.4–11.3)

## 2024-04-09 PROCEDURE — 82248 BILIRUBIN DIRECT: CPT

## 2024-04-09 PROCEDURE — 99213 OFFICE O/P EST LOW 20 MIN: CPT | Performed by: PODIATRIST

## 2024-04-09 PROCEDURE — 80053 COMPREHEN METABOLIC PANEL: CPT

## 2024-04-09 PROCEDURE — 85025 COMPLETE CBC W/AUTO DIFF WBC: CPT

## 2024-04-09 PROCEDURE — 84443 ASSAY THYROID STIM HORMONE: CPT

## 2024-04-09 PROCEDURE — 82465 ASSAY BLD/SERUM CHOLESTEROL: CPT

## 2024-04-09 PROCEDURE — 36415 COLL VENOUS BLD VENIPUNCTURE: CPT

## 2024-04-09 PROCEDURE — 83718 ASSAY OF LIPOPROTEIN: CPT

## 2024-04-09 NOTE — PROGRESS NOTES
Chief Complaint   Patient presents with    Nail Care     Patient is here today for routine nail care     Patient has chief complaint of bilateral painful nails.  Nails are quite thickened difficulty with shoe gear. Several feel ingrown. Baseline complaint.  Also admits to blood blister fifth digit right foot.  Etiology unknown.  Perhaps mild trauma.  It has shrunk in size no drainage no further pain.  No other new problems.no changes past medical history. No changes in review of systems.      Constitutional: Patient alert. No acute distress.  Psychiatric: Normal mood and affect.  HEENT: Sclera clear. Wearing glasses  Respiratory: Breathing unlabored.   Dermatologic: Multiple nails are hypertrophic crumbly and yellow. Mild onychocryptosis with the hallux nails without any acute inflammation noted.  Onycholysis distally hallux nails noted.  Painful to palpation.  There is a small dried blood blister fifth digit right foot.  Nothing to drain from it.  No acute inflammatory infectious process. Mild xerosis cutis noted. These are baseline findings to the patient.  No ulcers are pre-ulcerative areas in either foot.  Web spaces are dry. No tinea pedis.   Pulses intact and symmetric. Feet warm to touch. No ulcers or pre ulcer areas.   Motor function is grossly intact and symmetric.      Impression: Bilateral foot pain secondary to nail pathology. Xerosis cutis.  Blood blister fifth digit right foot.     -The current clinical findings, treatment course, and current plan were discussed with the patient in detail. Various questions were answered at that time. If there is any interval changes or new problems the patient will advise. This dictation was done using voice recognition software and may contain some grammatical errors.      -Nail debridement was performed this was a greater than 6 nails. Nails were manually debrided. They were decreased and girth in length. Appropriate care was discussed. Preventative care was  reviewed. Follow-up in about 2 months unless there is any other problems.     -I expect the fifth digit dry blood blister and jumps scab off on its own.  No intervention needed.    -Review also seasonal foot care.

## 2024-06-18 ENCOUNTER — APPOINTMENT (OUTPATIENT)
Dept: PODIATRY | Facility: CLINIC | Age: 74
End: 2024-06-18
Payer: MEDICARE

## 2024-07-01 DIAGNOSIS — E78.5 HYPERLIPIDEMIA, UNSPECIFIED HYPERLIPIDEMIA TYPE: ICD-10-CM

## 2024-07-01 DIAGNOSIS — E03.9 HYPOTHYROIDISM, UNSPECIFIED TYPE: ICD-10-CM

## 2024-07-05 NOTE — TELEPHONE ENCOUNTER
Patient has concerns about thyroid. She mentioned that she is getting more tired than usual and is experiencing hair loss. Please advise

## 2024-07-08 RX ORDER — LEVOTHYROXINE SODIUM 112 UG/1
112 TABLET ORAL
Qty: 90 TABLET | Refills: 3 | Status: SHIPPED | OUTPATIENT
Start: 2024-07-08

## 2024-07-08 RX ORDER — ATORVASTATIN CALCIUM 40 MG/1
40 TABLET, FILM COATED ORAL DAILY
Qty: 90 TABLET | Refills: 3 | Status: SHIPPED | OUTPATIENT
Start: 2024-07-08

## 2024-07-11 ENCOUNTER — TELEPHONE (OUTPATIENT)
Dept: PRIMARY CARE | Facility: CLINIC | Age: 74
End: 2024-07-11
Payer: MEDICARE

## 2024-07-11 NOTE — TELEPHONE ENCOUNTER
Patient called stating that she experiencing hair loss and was wondering if an increase in her thyroid medication would be neccessary. Please advise

## 2024-07-31 ENCOUNTER — APPOINTMENT (OUTPATIENT)
Dept: SURGERY | Facility: CLINIC | Age: 74
End: 2024-07-31
Payer: MEDICARE

## 2024-07-31 VITALS
OXYGEN SATURATION: 98 % | TEMPERATURE: 97 F | WEIGHT: 143 LBS | SYSTOLIC BLOOD PRESSURE: 138 MMHG | DIASTOLIC BLOOD PRESSURE: 84 MMHG | HEIGHT: 60 IN | BODY MASS INDEX: 28.07 KG/M2 | HEART RATE: 89 BPM | RESPIRATION RATE: 17 BRPM

## 2024-07-31 DIAGNOSIS — C18.4 ADENOCARCINOMA OF TRANSVERSE COLON (MULTI): Primary | ICD-10-CM

## 2024-07-31 PROCEDURE — 3008F BODY MASS INDEX DOCD: CPT | Performed by: SURGERY

## 2024-07-31 PROCEDURE — 1159F MED LIST DOCD IN RCRD: CPT | Performed by: SURGERY

## 2024-07-31 PROCEDURE — 99213 OFFICE O/P EST LOW 20 MIN: CPT | Performed by: SURGERY

## 2024-07-31 PROCEDURE — 1036F TOBACCO NON-USER: CPT | Performed by: SURGERY

## 2024-07-31 NOTE — PROGRESS NOTES
Established Patient Visit    Earlene presents for 6-month colon cancer follow-up.    The patient has been doing quite well since her last visit.  Her right lower quadrant abdominal pain has completely resolved.  She has no abdominal pain or GI symptoms whatsoever.  She has remained in good health.      1/31/2024:  Earlene Herr is a 73 y.o. female who presents for 6-month colon cancer follow-up.  Patient was last seen by me on 7/19/2023.  Please see the note(s) in legacy  [employer] for details.     Patient notes some chronic intermittent achiness and burning in the right lower quadrant of uncertain etiology.  This appears to be near a trocar site.  This is very intermittent and does not lifestyle changing.  Otherwise no other abdominal pain or GI symptoms.  She is noted no issues with regard to the umbilicus.       The  patient's most recent colonoscopy was on 8/17/2022 and was overall satisfactory.  A small hyperplastic polyp was noted in the descending colon.  Next colonoscopy recommended in 3 years per Dr. Mackay.       Patient lives in [place].  She is a retired .  The patient is a .  Her  passed away at home from an acute cardiac event in December 2022.     Past Medical History   Medical History        Past Medical History:   Diagnosis Date    Basal cell carcinoma of skin of other part of trunk 09/15/2015     Basal cell carcinoma of flank    Dorsalgia, unspecified 09/15/2015     Chronic back pain    Encounter for gynecological examination (general) (routine) without abnormal findings       Pap smear, as part of routine gynecological examination    Pain in unspecified foot 05/19/2016     Foot pain    Person consulting for explanation of examination or test findings       Visit for review of DEXA scan    Personal history of other diseases of the musculoskeletal system and connective tissue 08/02/2017     History of osteopenia    Personal history of other endocrine, nutritional and  metabolic disease 09/15/2015     History of thyroid disease    Personal history of other medical treatment       History of mammogram    Personal history of other specified (corrected) congenital malformations of nervous system and sense organs 09/15/2015     History of spina bifida    Pure hypercholesterolemia, unspecified 09/15/2015     High cholesterol            Surgical History    Surgical History         Past Surgical History:   Procedure Laterality Date    BREAST BIOPSY Right 10/2019     Benign right core     SECTION, CLASSIC   09/15/2015      Section    HYSTERECTOMY   09/15/2015     Hysterectomy    NOSE SURGERY   2017     Nose Surgery            Allergies   RX Allergies        Allergies   Allergen Reactions    Codeine Unknown    Sulfa (Sulfonamide Antibiotics) Unknown    Sulfamethoxazole-Trimethoprim Unknown            Home Meds       Current Outpatient Medications   Medication Instructions    ammonium lactate (Lac-Hydrin) 12 % lotion APPLY AS DIRECTED    atorvastatin (LIPITOR) 40 mg, oral, Daily    calcium carbonate-vitamin D3 600 mg-20 mcg (800 unit) tablet Caltrate 600 Plus-Vit D TABS   Refills: 0        Active    glucosamine-chondroit-vit C-Mn 500-400 mg capsule Glucosamine-Chondroitin Oral Capsule   Refills: 0         Start : 8-Oct-2020   Active    hydrocortisone acetate 2.5 % cream with perineal applicator USE AS DIRECTED    levothyroxine (SYNTHROID, LEVOXYL) 112 mcg, oral, Daily before breakfast    meloxicam (MOBIC) 15 mg, oral, Daily, For one week then as needed         Family History    Family History          Family History   Problem Relation Name Age of Onset    Bunion Mother        Other (cardiac disorder) Mother        Stroke Mother        Other (thyroid trouble) Mother        Prostate cancer Father        Other (bowel obstruction) Brother        Other (thyroid trouble) Brother                Social History  Social History   Social History            Tobacco Use    Smoking  status: Never    Smokeless tobacco: Never   Substance Use Topics    Alcohol use: Yes       Alcohol/week: 1.0 standard drink of alcohol       Types: 1 Glasses of wine per week       Comment: occassionally            Review Of Systems    Review of Systems     General: Not Present- Obesity, Cancer, HIV, MRSA, Recent Cold/Flu, Tired During the Day and VRE.  HEENT: Not Present- Migraine, Cataracts, Glaucoma, Macular Degeneration and Retinal Detachment.  Respiratory: Not Present- Asthma, Chronic Cough, Difficulty Breathing on Exertion, Difficulty Breathing at Rest, Emphysema, Frequent Bronchitis, Home CPAP/BiPAP, Home Oxygen, Pulmonary Embolus, Pneumonia/TB, Sleep Apnea and Snoring.  Cardiovascular: Not Present- Chest Pain, Congestive Heart Failure, Heart Attack, Coronary Artery Disease, Heart Stent, Hypertension, Internal Defibrillator, Irregular Heart Beat, Mitral Valve Prolapse, Murmur, Pacemaker and Peripheral Vascular Disease.  Gastrointestinal: Not Present- Heartburn, Hepatitis, Hiatal Hernia, Jaundice, Reflux, Stomach Ulcer and IBS.  Female Genitourinary: Not Present- Kidney Failure, Kidney Stones, Dialysis and Urinary Tract Infection.  Musculoskeletal: Not Present- Arthritis, Back Pain and Fibromyalgia.  Neurological: Not Present- Headaches, Numbness, Tingling, Seizures, Stroke,  Shunt and Weakness.  Psychiatric: Not Present- Bipolar, Depression and Panic Attacks.  Endocrine: Not Present- Diabetes, Hyperthyroidism and Low Blood Sugar.  Hematology: Not Present- Abnormal Bleeding, Anemia and Blood Clots.      Vitals  There were no vitals taken for this visit.      Physical Exam   Abdominal Physical Exam     General  General Appearance - Not in acute distress. Well-developed and well-nourished. Alert and oriented times 3.     Chest and Lung Exam  Auscultation:  Breath sounds: - Normal. Clear and equal bilaterally.  Adventitious sounds: - No Adventitious sounds.     Cardiovascular  Auscultation: Rate and Rhythm -  Regular. Heart Sounds - Normal heart sounds. No murmurs.     Abdomen  Inspection: - Inspection Normal.  Palpation/Percussion: Palpation and Percussion of the abdomen reveal - Non Tender, No Rebound tenderness, No Rigidity (guarding) and No Palpable abdominal masses.  Liver: - Normal.  Spleen: - Normal.  Auscultation: Auscultation of the abdomen reveals - Bowel sounds normal and No Abdominal bruits.  Surgical scars: Small midline superior to the umbilicus; old lower midline scar; laparoscopic x3  There is weakness at the supraumbilical fascia at the site of her incision -perhaps 2.5 cm in diameter; there may be a small ill-defined fascial defect; no significant change from previous     Inguinal  No inguinal or femoral hernias or lymphadenopathy bilaterally.     Rectal  Anorectal Exam: not examined.     Assessment/Plan     Mrs. Herr's clinical follow-up is satisfactory.     Will continue to monitor her abdominal wall particularly the supraumbilical site.  There was some fascial weakness and possibly a very small incisional hernia at that site.  This is completely asymptomatic for her however.     Diagnosis:     Colon adenocarcinoma, proximal transverse colon  Stage I (T1 N0)  S/p laparoscopic extended ileocolectomy, 11/4/2021, [facility]     Recommendations:     Follow-up in 6 months for recheck     Next colonoscopy August 2025 per Dr. Macaky -will arrange at next visit

## 2024-09-03 ENCOUNTER — APPOINTMENT (OUTPATIENT)
Dept: PODIATRY | Facility: CLINIC | Age: 74
End: 2024-09-03
Payer: MEDICARE

## 2024-09-03 DIAGNOSIS — L85.3 XEROSIS CUTIS: ICD-10-CM

## 2024-09-03 DIAGNOSIS — R23.8 SKIN BULLA: ICD-10-CM

## 2024-09-03 DIAGNOSIS — M79.672 FOOT PAIN, BILATERAL: Primary | ICD-10-CM

## 2024-09-03 DIAGNOSIS — B35.1 ONYCHOMYCOSIS: ICD-10-CM

## 2024-09-03 DIAGNOSIS — M79.671 FOOT PAIN, BILATERAL: Primary | ICD-10-CM

## 2024-09-03 DIAGNOSIS — L60.1 ONYCHOLYSIS: ICD-10-CM

## 2024-09-03 PROCEDURE — 99213 OFFICE O/P EST LOW 20 MIN: CPT | Performed by: PODIATRIST

## 2024-09-03 NOTE — PROGRESS NOTES
Chief Complaint   Patient presents with    nail care     Patient is here today for nail care     Patient has chief complaint of bilateral painful nails.  Nails are quite thickened difficulty with shoe gear. Several feel ingrown. Baseline complaint.  Also admits to blood blister fifth digit right foot.  Etiology unknown.  Perhaps mild trauma.  It has shrunk in size no drainage no further pain.  No other new problems.no changes past medical history. No changes in review of systems.      Constitutional: Patient alert. No acute distress.  Psychiatric: Normal mood and affect.  HEENT: Sclera clear. Wearing glasses  Respiratory: Breathing unlabored.   Dermatologic: Multiple nails are hypertrophic crumbly and yellow.  They are painful.  Progressive elongation of the nails.  Mild onychocryptosis with the hallux nails without any acute inflammation noted.  Onycholysis distally hallux nails noted.  This is baseline.  Mild xerosis cutis noted. These are baseline findings to the patient.  No ulcers are pre-ulcerative areas in either foot.  Web spaces are dry. No tinea pedis.   Pulses intact and symmetric. Feet warm to touch. No ulcers or pre ulcer areas.   Motor function is grossly intact and symmetric.      Impression: Bilateral foot pain secondary to nail pathology. Xerosis cutis.  Resolved blood blister fifth digit right foot.     -The current clinical findings, treatment course, and current plan were discussed with the patient in detail. Various questions were answered at that time. If there is any interval changes or new problems the patient will advise. This dictation was done using voice recognition software and may contain some grammatical errors.      -Nail debridement was performed this was a greater than 6 nails. Nails were manually debrided. They were decreased and girth in length. Appropriate care was discussed. Preventative care was reviewed. Follow-up in about 2 months unless there is any other problems.      -Discussed seasonal footcare.

## 2024-09-04 ENCOUNTER — APPOINTMENT (OUTPATIENT)
Dept: PRIMARY CARE | Facility: CLINIC | Age: 74
End: 2024-09-04
Payer: MEDICARE

## 2024-09-05 ENCOUNTER — APPOINTMENT (OUTPATIENT)
Dept: PRIMARY CARE | Facility: CLINIC | Age: 74
End: 2024-09-05
Payer: MEDICARE

## 2024-09-09 ENCOUNTER — APPOINTMENT (OUTPATIENT)
Dept: PRIMARY CARE | Facility: CLINIC | Age: 74
End: 2024-09-09
Payer: MEDICARE

## 2024-09-09 VITALS
TEMPERATURE: 98 F | BODY MASS INDEX: 27.54 KG/M2 | SYSTOLIC BLOOD PRESSURE: 134 MMHG | WEIGHT: 141 LBS | HEART RATE: 100 BPM | OXYGEN SATURATION: 97 % | DIASTOLIC BLOOD PRESSURE: 80 MMHG

## 2024-09-09 DIAGNOSIS — Z00.00 MEDICARE ANNUAL WELLNESS VISIT, SUBSEQUENT: Primary | ICD-10-CM

## 2024-09-09 DIAGNOSIS — Z71.89 ADVANCE DIRECTIVE DISCUSSED WITH PATIENT: ICD-10-CM

## 2024-09-09 DIAGNOSIS — E03.9 HYPOTHYROIDISM, UNSPECIFIED TYPE: ICD-10-CM

## 2024-09-09 DIAGNOSIS — Z13.31 DEPRESSION SCREEN: ICD-10-CM

## 2024-09-09 DIAGNOSIS — Z12.31 ENCOUNTER FOR SCREENING MAMMOGRAM FOR BREAST CANCER: ICD-10-CM

## 2024-09-09 DIAGNOSIS — Z13.6 SCREENING FOR CARDIOVASCULAR CONDITION: ICD-10-CM

## 2024-09-09 DIAGNOSIS — E78.5 HYPERLIPIDEMIA, UNSPECIFIED HYPERLIPIDEMIA TYPE: ICD-10-CM

## 2024-09-09 PROCEDURE — 90662 IIV NO PRSV INCREASED AG IM: CPT | Performed by: INTERNAL MEDICINE

## 2024-09-09 PROCEDURE — 1170F FXNL STATUS ASSESSED: CPT | Performed by: INTERNAL MEDICINE

## 2024-09-09 PROCEDURE — G0446 INTENS BEHAVE THER CARDIO DX: HCPCS | Performed by: INTERNAL MEDICINE

## 2024-09-09 PROCEDURE — 99213 OFFICE O/P EST LOW 20 MIN: CPT | Performed by: INTERNAL MEDICINE

## 2024-09-09 PROCEDURE — G0444 DEPRESSION SCREEN ANNUAL: HCPCS | Performed by: INTERNAL MEDICINE

## 2024-09-09 PROCEDURE — 1159F MED LIST DOCD IN RCRD: CPT | Performed by: INTERNAL MEDICINE

## 2024-09-09 PROCEDURE — 99497 ADVNCD CARE PLAN 30 MIN: CPT | Performed by: INTERNAL MEDICINE

## 2024-09-09 PROCEDURE — G0439 PPPS, SUBSEQ VISIT: HCPCS | Performed by: INTERNAL MEDICINE

## 2024-09-09 PROCEDURE — 1036F TOBACCO NON-USER: CPT | Performed by: INTERNAL MEDICINE

## 2024-09-09 PROCEDURE — G0008 ADMIN INFLUENZA VIRUS VAC: HCPCS | Performed by: INTERNAL MEDICINE

## 2024-09-09 ASSESSMENT — ACTIVITIES OF DAILY LIVING (ADL)
GROCERY_SHOPPING: INDEPENDENT
DOING_HOUSEWORK: INDEPENDENT
TAKING_MEDICATION: INDEPENDENT
BATHING: INDEPENDENT
DRESSING: INDEPENDENT
MANAGING_FINANCES: INDEPENDENT

## 2024-09-09 NOTE — PROGRESS NOTES
My nurse note reviewed. Patient is here for:  Medicare Annual Wellness Visit Subsequent (Has been feeling fatigue/Neck itch)       Pt is here for medicare wellness and follow up    Patient denies any shortness of breath, PND, orthopnea, chest pain , palpitation, syncope or edema in legs  patient denies any abdominal pain, tenderness, nausea, vomiting, change in bowel habits or blood in stool.  Patient denies any weakness in extremities.. Denies any headache, visual symptoms , speech problems or  tremors . No TIA or stroke like symptoms..    Over the past 2 weeks, how often have you been bothered by any of the following problems?  Little interest or pleasure in doing things: Not at all  Feeling down, depressed, or hopeless: Not at all  Functional Ability/Level of Safety  Cognitive Impairment Observed: No cognitive impairment observed  Cognitive Impairment Reported: No cognitive impairment reported by patient or family  Nutrition and Exercise  Current Diet: Well Balanced Diet  Adequate Fluid Intake: Yes  Caffeine: No  Exercise Frequency: Infrequently  IADL's  Grocery Shopping: Independent  Doing Housework: Independent  Taking Medication: Independent  Managing Finances: Independent  [place] Home Safety Risk Factors  Home Safety Risk Factors: None     Taking meds ok     During Medicare wellness apart from looking at assessment done by nurse,  I also asked following :    Alcohol use : Alcohol screening  was  done during this visit. Patient is not having any issue with increase alcohol use . No ETOH abuse was observed by history . Rarely drinks .    Depression screen:  > 5 minutes  were spent screening for depression using PHQ2/PHQ9 as documented in the chart.    HIV test: patient was not found to be high risk for HIV     Cognitive issue : None     Advanced Directive: Patient has advanced directive including living will and Health care power of . Health POA is Daughter , Tyler . All questions related to it  answered. Total time > 16 min.     I have reviewed all active medications patient is currently on . Questions related to medication answered to patient's satisfaction.    During office visit today patient's chronic diagnosis were reviewed and questions related to it answered to patient's satisfaction . Risk factors for heart, stroke were discussed with patient . Discussion about preventative tests were done with patient also . pain issue was discussed as appropriate for patient . I plan to follow up patient's chronic medical conditions as appropriate.   ASCVD score is 16.1 %     OBJECTIVE :  /80   Pulse 100   Temp 36.7 °C (98 °F)   Wt 64 kg (141 lb)   SpO2 97%   BMI 27.54 kg/m²   Repeat bp is ok   Vitals noted   Not in acute distress  Conj Pink, No icterus  Neck:No Cervical LN enlargement, No Thyroid enlargement No carotid bruit  Lungs: good air entry bilaterally, no rales or rhonchi  Breast examination: Breasts are symmetric. No dominant or discrete suspicious masses noted in breast area.  No nipple changes or discharge noted.  CVS: S1 S2 + , no S3. No loud heart murmur heard.   Abdomen: Soft, non tender , BS +. no organomegaly , no CVA tenderness  MSK: No spine tenderness or muscle tenderness noted on gross examination  CNS: Pt is alert, moving all 4 extremities. no motor weakness or abnormal movements noted on gross examination.  Extremities: No edema, No calf tenderness, Sihtal's sign negative.    Assessment:  1. Medicare annual wellness visit, subsequent  Done. Tests ordered      2. Advance directive discussed with patient  done      3. Depression screen  done      4. Screening for cardiovascular condition  done      5. Hypothyroidism, unspecified type  On med. Check TSH      6. Hyperlipidemia, unspecified hyperlipidemia type  CBC and Auto Differential    Basic Metabolic Panel    Hepatic Function Panel    Lipid Panel Non-Fasting      7. Encounter for screening mammogram for breast cancer  BI mammo  bilateral screening tomosynthesis        Plan  Medicare wellness done  Blood tests ordered  Mammogram  Flu shot   All questions answered.   Current medications are effective. advised to continue current medications.  F/U 6 months and 12 months for medicare wellness.

## 2024-09-09 NOTE — PATIENT INSTRUCTIONS
Plan  Medicare wellness done  Blood tests ordered  Mammogram'  Flu shot   All questions answered.   Current medications are effective. advised to continue current medications.  F/U 6 months and 12 months for medicare wellness.

## 2024-09-12 ENCOUNTER — LAB (OUTPATIENT)
Dept: LAB | Facility: LAB | Age: 74
End: 2024-09-12
Payer: MEDICARE

## 2024-09-12 DIAGNOSIS — E78.5 HYPERLIPIDEMIA, UNSPECIFIED HYPERLIPIDEMIA TYPE: ICD-10-CM

## 2024-09-12 LAB
ALBUMIN SERPL BCP-MCNC: 4.1 G/DL (ref 3.4–5)
ALP SERPL-CCNC: 62 U/L (ref 33–136)
ALT SERPL W P-5'-P-CCNC: 21 U/L (ref 7–45)
ANION GAP SERPL CALC-SCNC: 11 MMOL/L (ref 10–20)
AST SERPL W P-5'-P-CCNC: 17 U/L (ref 9–39)
BASOPHILS # BLD AUTO: 0.04 X10*3/UL (ref 0–0.1)
BASOPHILS NFR BLD AUTO: 0.6 %
BILIRUB DIRECT SERPL-MCNC: 0.1 MG/DL (ref 0–0.3)
BILIRUB SERPL-MCNC: 0.7 MG/DL (ref 0–1.2)
BUN SERPL-MCNC: 19 MG/DL (ref 6–23)
CALCIUM SERPL-MCNC: 9.6 MG/DL (ref 8.6–10.3)
CHLORIDE SERPL-SCNC: 104 MMOL/L (ref 98–107)
CHOLEST SERPL-MCNC: 136 MG/DL (ref 0–199)
CHOLESTEROL/HDL RATIO: 3.2
CO2 SERPL-SCNC: 29 MMOL/L (ref 21–32)
CREAT SERPL-MCNC: 0.65 MG/DL (ref 0.5–1.05)
EGFRCR SERPLBLD CKD-EPI 2021: >90 ML/MIN/1.73M*2
EOSINOPHIL # BLD AUTO: 0.27 X10*3/UL (ref 0–0.4)
EOSINOPHIL NFR BLD AUTO: 4 %
ERYTHROCYTE [DISTWIDTH] IN BLOOD BY AUTOMATED COUNT: 13.1 % (ref 11.5–14.5)
GLUCOSE SERPL-MCNC: 74 MG/DL (ref 74–99)
HCT VFR BLD AUTO: 43.3 % (ref 36–46)
HDLC SERPL-MCNC: 43 MG/DL
HGB BLD-MCNC: 14.4 G/DL (ref 12–16)
IMM GRANULOCYTES # BLD AUTO: 0.03 X10*3/UL (ref 0–0.5)
IMM GRANULOCYTES NFR BLD AUTO: 0.4 % (ref 0–0.9)
LYMPHOCYTES # BLD AUTO: 1.77 X10*3/UL (ref 0.8–3)
LYMPHOCYTES NFR BLD AUTO: 26.4 %
MCH RBC QN AUTO: 28.8 PG (ref 26–34)
MCHC RBC AUTO-ENTMCNC: 33.3 G/DL (ref 32–36)
MCV RBC AUTO: 87 FL (ref 80–100)
MONOCYTES # BLD AUTO: 0.68 X10*3/UL (ref 0.05–0.8)
MONOCYTES NFR BLD AUTO: 10.1 %
NEUTROPHILS # BLD AUTO: 3.92 X10*3/UL (ref 1.6–5.5)
NEUTROPHILS NFR BLD AUTO: 58.5 %
NON-HDL CHOLESTEROL: 93 MG/DL (ref 0–149)
NRBC BLD-RTO: 0 /100 WBCS (ref 0–0)
PLATELET # BLD AUTO: 283 X10*3/UL (ref 150–450)
POTASSIUM SERPL-SCNC: 3.9 MMOL/L (ref 3.5–5.3)
PROT SERPL-MCNC: 6.8 G/DL (ref 6.4–8.2)
RBC # BLD AUTO: 5 X10*6/UL (ref 4–5.2)
SODIUM SERPL-SCNC: 140 MMOL/L (ref 136–145)
WBC # BLD AUTO: 6.7 X10*3/UL (ref 4.4–11.3)

## 2024-09-12 PROCEDURE — 82248 BILIRUBIN DIRECT: CPT

## 2024-09-12 PROCEDURE — 83718 ASSAY OF LIPOPROTEIN: CPT

## 2024-09-12 PROCEDURE — 36415 COLL VENOUS BLD VENIPUNCTURE: CPT

## 2024-09-12 PROCEDURE — 85025 COMPLETE CBC W/AUTO DIFF WBC: CPT

## 2024-09-12 PROCEDURE — 80053 COMPREHEN METABOLIC PANEL: CPT

## 2024-09-12 PROCEDURE — 82465 ASSAY BLD/SERUM CHOLESTEROL: CPT

## 2024-09-18 ENCOUNTER — TELEPHONE (OUTPATIENT)
Dept: PRIMARY CARE | Facility: CLINIC | Age: 74
End: 2024-09-18
Payer: MEDICARE

## 2024-09-18 NOTE — TELEPHONE ENCOUNTER
Notified        ----- Message from Tito Raymond sent at 9/13/2024  2:13 PM EDT -----  I have reviewed your recent blood tests ordered by me and there were no significant abnormality noted.   Please notify patient. Thanks.

## 2024-12-31 ENCOUNTER — HOSPITAL ENCOUNTER (OUTPATIENT)
Dept: RADIOLOGY | Facility: CLINIC | Age: 74
Discharge: HOME | End: 2024-12-31
Payer: MEDICARE

## 2024-12-31 VITALS — WEIGHT: 145 LBS | HEIGHT: 60 IN | BODY MASS INDEX: 28.47 KG/M2

## 2024-12-31 DIAGNOSIS — Z12.31 ENCOUNTER FOR SCREENING MAMMOGRAM FOR BREAST CANCER: ICD-10-CM

## 2024-12-31 PROCEDURE — 77063 BREAST TOMOSYNTHESIS BI: CPT | Performed by: RADIOLOGY

## 2024-12-31 PROCEDURE — 77063 BREAST TOMOSYNTHESIS BI: CPT

## 2024-12-31 PROCEDURE — 77067 SCR MAMMO BI INCL CAD: CPT | Performed by: RADIOLOGY

## 2025-01-07 ENCOUNTER — APPOINTMENT (OUTPATIENT)
Dept: PODIATRY | Facility: CLINIC | Age: 75
End: 2025-01-07
Payer: MEDICARE

## 2025-01-20 ENCOUNTER — APPOINTMENT (OUTPATIENT)
Dept: PODIATRY | Facility: CLINIC | Age: 75
End: 2025-01-20
Payer: MEDICARE

## 2025-02-12 ENCOUNTER — APPOINTMENT (OUTPATIENT)
Dept: SURGERY | Facility: CLINIC | Age: 75
End: 2025-02-12
Payer: MEDICARE

## 2025-02-19 ENCOUNTER — APPOINTMENT (OUTPATIENT)
Dept: SURGERY | Facility: CLINIC | Age: 75
End: 2025-02-19
Payer: MEDICARE

## 2025-02-19 ENCOUNTER — TELEPHONE (OUTPATIENT)
Dept: SURGERY | Facility: CLINIC | Age: 75
End: 2025-02-19

## 2025-02-19 VITALS
SYSTOLIC BLOOD PRESSURE: 142 MMHG | WEIGHT: 149 LBS | HEIGHT: 60 IN | OXYGEN SATURATION: 96 % | TEMPERATURE: 97.5 F | DIASTOLIC BLOOD PRESSURE: 83 MMHG | HEART RATE: 102 BPM | BODY MASS INDEX: 29.25 KG/M2 | RESPIRATION RATE: 16 BRPM

## 2025-02-19 DIAGNOSIS — Z85.038 HISTORY OF COLON CANCER, STAGE I: Primary | ICD-10-CM

## 2025-02-19 PROCEDURE — 99213 OFFICE O/P EST LOW 20 MIN: CPT | Performed by: SURGERY

## 2025-02-19 PROCEDURE — 1159F MED LIST DOCD IN RCRD: CPT | Performed by: SURGERY

## 2025-02-19 RX ORDER — LORATADINE 10 MG/1
TABLET ORAL
COMMUNITY

## 2025-02-19 NOTE — TELEPHONE ENCOUNTER
Patient of Dr. Pena, came in on 02/19/2025 and will need to be scheduled for a colonoscopy in August 2025.  Thank you

## 2025-02-19 NOTE — PROGRESS NOTES
Established Patient Visit    Earlene presents for 6-month colon cancer follow-up.    Patient has been doing very well.  No abdominal pain or GI symptoms.  No new medical problems since her last visit.  Recent CBC, BMP, and liver profile from 9/12/2024 were all normal.    7/31/2024:  Earlene presents for 6-month colon cancer follow-up.     The patient has been doing quite well since her last visit.  Her right lower quadrant abdominal pain has completely resolved.  She has no abdominal pain or GI symptoms whatsoever.  She has remained in good health.     1/31/2024:  Earlene Herr is a 73 y.o. female who presents for 6-month colon cancer follow-up.  Patient was last seen by me on 7/19/2023.  Please see the note(s) in legacy  [employer] for details.     Patient notes some chronic intermittent achiness and burning in the right lower quadrant of uncertain etiology.  This appears to be near a trocar site.  This is very intermittent and does not lifestyle changing.  Otherwise no other abdominal pain or GI symptoms.  She is noted no issues with regard to the umbilicus.       The  patient's most recent colonoscopy was on 8/17/2022 and was overall satisfactory.  A small hyperplastic polyp was noted in the descending colon.  Next colonoscopy recommended in 3 years per Dr. Mackay.       Patient lives in [place].  She is a retired .  The patient is a .  Her  passed away at home from an acute cardiac event in December 2022.     Past Medical History   Medical History           Past Medical History:   Diagnosis Date    Basal cell carcinoma of skin of other part of trunk 09/15/2015     Basal cell carcinoma of flank    Dorsalgia, unspecified 09/15/2015     Chronic back pain    Encounter for gynecological examination (general) (routine) without abnormal findings       Pap smear, as part of routine gynecological examination    Pain in unspecified foot 05/19/2016     Foot pain    Person consulting for explanation of  examination or test findings       Visit for review of DEXA scan    Personal history of other diseases of the musculoskeletal system and connective tissue 2017     History of osteopenia    Personal history of other endocrine, nutritional and metabolic disease 09/15/2015     History of thyroid disease    Personal history of other medical treatment       History of mammogram    Personal history of other specified (corrected) congenital malformations of nervous system and sense organs 09/15/2015     History of spina bifida    Pure hypercholesterolemia, unspecified 09/15/2015     High cholesterol            Surgical History    Surgical History             Past Surgical History:   Procedure Laterality Date    BREAST BIOPSY Right 10/2019     Benign right core     SECTION, CLASSIC   09/15/2015      Section    HYSTERECTOMY   09/15/2015     Hysterectomy    NOSE SURGERY   2017     Nose Surgery            Allergies   RX Allergies           Allergies   Allergen Reactions    Codeine Unknown    Sulfa (Sulfonamide Antibiotics) Unknown    Sulfamethoxazole-Trimethoprim Unknown            Home Meds          Current Outpatient Medications   Medication Instructions    ammonium lactate (Lac-Hydrin) 12 % lotion APPLY AS DIRECTED    atorvastatin (LIPITOR) 40 mg, oral, Daily    calcium carbonate-vitamin D3 600 mg-20 mcg (800 unit) tablet Caltrate 600 Plus-Vit D TABS   Refills: 0        Active    glucosamine-chondroit-vit C-Mn 500-400 mg capsule Glucosamine-Chondroitin Oral Capsule   Refills: 0         Start : 8-Oct-2020   Active    hydrocortisone acetate 2.5 % cream with perineal applicator USE AS DIRECTED    levothyroxine (SYNTHROID, LEVOXYL) 112 mcg, oral, Daily before breakfast    meloxicam (MOBIC) 15 mg, oral, Daily, For one week then as needed         Family History    Family History               Family History   Problem Relation Name Age of Onset    Bunion Mother        Other (cardiac disorder) Mother         Stroke Mother        Other (thyroid trouble) Mother        Prostate cancer Father        Other (bowel obstruction) Brother        Other (thyroid trouble) Brother                Social History  Social History   Social History                Tobacco Use    Smoking status: Never    Smokeless tobacco: Never   Substance Use Topics    Alcohol use: Yes       Alcohol/week: 1.0 standard drink of alcohol       Types: 1 Glasses of wine per week       Comment: occassionally            Review Of Systems    Review of Systems     General: Not Present- Obesity, Cancer, HIV, MRSA, Recent Cold/Flu, Tired During the Day and VRE.  HEENT: Not Present- Migraine, Cataracts, Glaucoma, Macular Degeneration and Retinal Detachment.  Respiratory: Not Present- Asthma, Chronic Cough, Difficulty Breathing on Exertion, Difficulty Breathing at Rest, Emphysema, Frequent Bronchitis, Home CPAP/BiPAP, Home Oxygen, Pulmonary Embolus, Pneumonia/TB, Sleep Apnea and Snoring.  Cardiovascular: Not Present- Chest Pain, Congestive Heart Failure, Heart Attack, Coronary Artery Disease, Heart Stent, Hypertension, Internal Defibrillator, Irregular Heart Beat, Mitral Valve Prolapse, Murmur, Pacemaker and Peripheral Vascular Disease.  Gastrointestinal: Not Present- Heartburn, Hepatitis, Hiatal Hernia, Jaundice, Reflux, Stomach Ulcer and IBS.  Female Genitourinary: Not Present- Kidney Failure, Kidney Stones, Dialysis and Urinary Tract Infection.  Musculoskeletal: Not Present- Arthritis, Back Pain and Fibromyalgia.  Neurological: Not Present- Headaches, Numbness, Tingling, Seizures, Stroke,  Shunt and Weakness.  Psychiatric: Not Present- Bipolar, Depression and Panic Attacks.  Endocrine: Not Present- Diabetes, Hyperthyroidism and Low Blood Sugar.  Hematology: Not Present- Abnormal Bleeding, Anemia and Blood Clots.      Vitals  There were no vitals taken for this visit.      Physical Exam   Abdominal Physical Exam     General  General Appearance - Not in acute  distress. Well-developed and well-nourished. Alert and oriented times 3.     Chest and Lung Exam  Auscultation:  Breath sounds: - Normal. Clear and equal bilaterally.  Adventitious sounds: - No Adventitious sounds.     Cardiovascular  Auscultation: Rate and Rhythm - Regular. Heart Sounds - Normal heart sounds. No murmurs.     Abdomen  Inspection: - Inspection Normal.  Palpation/Percussion: Palpation and Percussion of the abdomen reveal - Non Tender, No Rebound tenderness, No Rigidity (guarding) and No Palpable abdominal masses.  Liver: - Normal.  Spleen: - Normal.  Auscultation: Auscultation of the abdomen reveals - Bowel sounds normal and No Abdominal bruits.  Surgical scars: Small midline superior to the umbilicus; old lower midline scar; laparoscopic x3  There is weakness at the supraumbilical fascia at the site of her incision -3 cm transverse by 1.5 cm sagittal; there may be a small ill-defined fascial defect;  perhaps minimally larger than previous     Inguinal  No inguinal or femoral hernias or lymphadenopathy bilaterally.     Rectal  Anorectal Exam: not examined.     Assessment/Plan      Mrs. Herr's clinical follow-up mic satisfactory.     Will continue to monitor her abdominal wall particularly the supraumbilical site.  There was some fascial weakness and possibly a very small incisional hernia at that site.  This is completely asymptomatic for her however.     Diagnosis:     Colon adenocarcinoma, proximal transverse colon  Stage I (T1 N0)  S/p laparoscopic extended ileocolectomy, 11/4/2021, [facility]     Recommendations:     No physical restrictions    Follow-up in 6 months for recheck     Next colonoscopy August 2025 per Dr. Mackay -will mario Nichole our [facility] endoscopy  arrange

## 2025-02-24 NOTE — TELEPHONE ENCOUNTER
Left message fopr patient to call me on my direct number to schedule her August 25 colon. Per Dr. Pena.

## 2025-03-10 ENCOUNTER — APPOINTMENT (OUTPATIENT)
Dept: PRIMARY CARE | Facility: CLINIC | Age: 75
End: 2025-03-10
Payer: MEDICARE

## 2025-03-10 VITALS
SYSTOLIC BLOOD PRESSURE: 105 MMHG | OXYGEN SATURATION: 99 % | HEART RATE: 56 BPM | WEIGHT: 146 LBS | DIASTOLIC BLOOD PRESSURE: 73 MMHG | HEIGHT: 60 IN | TEMPERATURE: 98.2 F | BODY MASS INDEX: 28.66 KG/M2

## 2025-03-10 DIAGNOSIS — E03.9 HYPOTHYROIDISM, UNSPECIFIED TYPE: ICD-10-CM

## 2025-03-10 DIAGNOSIS — C18.4 MALIGNANT NEOPLASM OF TRANSVERSE COLON (MULTI): ICD-10-CM

## 2025-03-10 DIAGNOSIS — L65.9 HAIR LOSS: ICD-10-CM

## 2025-03-10 DIAGNOSIS — E78.5 HYPERLIPIDEMIA, UNSPECIFIED HYPERLIPIDEMIA TYPE: Primary | ICD-10-CM

## 2025-03-10 PROCEDURE — G2211 COMPLEX E/M VISIT ADD ON: HCPCS | Performed by: INTERNAL MEDICINE

## 2025-03-10 PROCEDURE — 1159F MED LIST DOCD IN RCRD: CPT | Performed by: INTERNAL MEDICINE

## 2025-03-10 PROCEDURE — 99214 OFFICE O/P EST MOD 30 MIN: CPT | Performed by: INTERNAL MEDICINE

## 2025-03-10 NOTE — PATIENT INSTRUCTIONS
Plan  Pt will try Natural products for hair   Current medications are effective. advised to continue current medications.  Keep appt for colonoscopy as scheduled.   F/U in sept as scheduled for Medicare wellness.

## 2025-03-10 NOTE — PROGRESS NOTES
My nurse note reviewed. Patient is here for:  Follow-up (6 month follow up)       Pt is here for f/U on thyroid , cholesterol  Has loss of hair, has seen dermatologist . Doesnot want to try med for it. She will try something natural like Rosemary oil or so.     Patient denies any shortness of breath, PND, orthopnea, chest pain , palpitation, syncope or edema in legs    Patient denies any weakness in extremities.. Denies any headache, visual symptoms , speech problems or  tremors . No TIA or stroke like symptoms..    During office visit today patient's chronic diagnosis were reviewed and questions related to it answered to patient's satisfaction . Risk factors for heart, stroke were discussed with patient . Discussion about preventative tests were done with patient also . pain issue was discussed as appropriate for patient . I plan to follow up patient's chronic medical conditions as appropriate.     OBJECTIVE :  /73   Pulse 56   Temp 36.8 °C (98.2 °F) (Temporal)   Ht 1.524 m (5')   Wt 66.2 kg (146 lb)   SpO2 99%   BMI 28.51 kg/m²   CVS: S1 S2 + , no S3. No loud heart murmur appreciated. Lungs clear, No edema  CNS: Patient is alert, oriented moving all 4 extremities well. No motor weakness noted on gross neurological exam. No involuntary movements or tremors noted.      Assessment:  1. Hyperlipidemia, unspecified hyperlipidemia type  On med. Continue it.       2. Hypothyroidism, unspecified type  On med. Continue it      3. Hair loss  Discussed about it.       4 colon cancer -- scheduled for repeat colon test in few months .   Plan  Pt will try Natural products for hair   Current medications are effective. advised to continue current medications.  Keep appt for colonoscopy as scheduled.   F/U in sept as scheduled for Medicare wellness.

## 2025-03-11 ENCOUNTER — OFFICE VISIT (OUTPATIENT)
Dept: PODIATRY | Facility: CLINIC | Age: 75
End: 2025-03-11
Payer: MEDICARE

## 2025-03-11 VITALS — HEIGHT: 60 IN | WEIGHT: 146 LBS | BODY MASS INDEX: 28.66 KG/M2

## 2025-03-11 DIAGNOSIS — L60.0 ONYCHOCRYPTOSIS: ICD-10-CM

## 2025-03-11 DIAGNOSIS — L85.3 XEROSIS CUTIS: ICD-10-CM

## 2025-03-11 DIAGNOSIS — M79.672 FOOT PAIN, BILATERAL: Primary | ICD-10-CM

## 2025-03-11 DIAGNOSIS — B35.1 ONYCHOMYCOSIS: ICD-10-CM

## 2025-03-11 DIAGNOSIS — M79.671 FOOT PAIN, BILATERAL: Primary | ICD-10-CM

## 2025-03-11 DIAGNOSIS — L60.1 ONYCHOLYSIS: ICD-10-CM

## 2025-03-11 PROCEDURE — 1036F TOBACCO NON-USER: CPT | Performed by: PODIATRIST

## 2025-03-11 PROCEDURE — 99213 OFFICE O/P EST LOW 20 MIN: CPT | Performed by: PODIATRIST

## 2025-03-11 PROCEDURE — 1160F RVW MEDS BY RX/DR IN RCRD: CPT | Performed by: PODIATRIST

## 2025-03-11 PROCEDURE — 1159F MED LIST DOCD IN RCRD: CPT | Performed by: PODIATRIST

## 2025-05-27 DIAGNOSIS — E78.5 HYPERLIPIDEMIA, UNSPECIFIED HYPERLIPIDEMIA TYPE: ICD-10-CM

## 2025-05-28 RX ORDER — ATORVASTATIN CALCIUM 40 MG/1
40 TABLET, FILM COATED ORAL DAILY
Qty: 90 TABLET | Refills: 3 | Status: SHIPPED | OUTPATIENT
Start: 2025-05-28

## 2025-06-11 ENCOUNTER — APPOINTMENT (OUTPATIENT)
Dept: PODIATRY | Facility: CLINIC | Age: 75
End: 2025-06-11
Payer: MEDICARE

## 2025-06-16 ENCOUNTER — APPOINTMENT (OUTPATIENT)
Dept: PODIATRY | Facility: CLINIC | Age: 75
End: 2025-06-16
Payer: MEDICARE

## 2025-06-25 ENCOUNTER — APPOINTMENT (OUTPATIENT)
Dept: PODIATRY | Facility: CLINIC | Age: 75
End: 2025-06-25
Payer: MEDICARE

## 2025-06-25 DIAGNOSIS — L85.3 XEROSIS CUTIS: ICD-10-CM

## 2025-06-25 DIAGNOSIS — M79.671 FOOT PAIN, BILATERAL: Primary | ICD-10-CM

## 2025-06-25 DIAGNOSIS — M79.672 FOOT PAIN, BILATERAL: Primary | ICD-10-CM

## 2025-06-25 DIAGNOSIS — B35.1 ONYCHOMYCOSIS: ICD-10-CM

## 2025-06-25 DIAGNOSIS — L60.0 ONYCHOCRYPTOSIS: ICD-10-CM

## 2025-06-25 PROCEDURE — 99213 OFFICE O/P EST LOW 20 MIN: CPT | Performed by: PODIATRIST

## 2025-06-25 NOTE — PROGRESS NOTES
Chief Complaint   Patient presents with    Follow-up     NAIL CARE     Chief Complaint   Patient presents with    Follow-up     NAIL CARE   Established patient following up for chief complaint of bilateral painful nails.  Nails are quite thickened difficulty with shoe gear. Several feel ingrown.  Nails are quite elongated.   No other new problems.no changes past medical history. No changes in review of systems.      Constitutional: Patient alert. No acute distress.  Psychiatric: Normal mood and affect.  Respiratory: Breathing unlabored.   Dermatologic: Multiple nails are hypertrophic crumbly and yellow.  They are painful.  Nails are elongated.  Mild onychocryptosis fourth digit right foot without any acute inflammation noted.  Onycholysis distally hallux nails noted.  This is baseline.  Additionally there is a transverse ridge mid portion of the left great toenail.  Mild xerosis cutis noted.  No pressure areas noted except for fifth digit right foot.  No ulcers are pre-ulcerative areas in either foot. Web spaces are dry. No tinea pedis.   Pulses intact and symmetric. Feet warm to touch. No ulcers or pre ulcer areas.   Neurological: Gross sensation intact.  Monofilament testing intact though blunted on the right side.  Proprioception intact.    Motor function is grossly intact and symmetric.  No gross deformities noted.  No weakness apparent.     Impression: Painful onychomycosis bilateral.  Onychocryptosis fourth digit right foot adductovarus fifth digits. Xerosis cutis.      -The current clinical findings, treatment course, and current plan were discussed with the patient in detail. Various questions were answered at that time. If there is any interval changes or new problems the patient will advise. This dictation was done using voice recognition software and may contain some grammatical errors.      -Nail debridement was performed this was a greater than 6 nails. Nails were manually debrided. They were decreased  and girth in length. Appropriate care was discussed. Preventative care was reviewed. Follow-up in about 2 months unless there is any other problems.     -Discussed seasonal footcare.    - No new labs or notes to review.

## 2025-07-01 DIAGNOSIS — E03.9 HYPOTHYROIDISM, UNSPECIFIED TYPE: ICD-10-CM

## 2025-07-02 RX ORDER — LEVOTHYROXINE SODIUM 112 UG/1
112 TABLET ORAL
Qty: 90 TABLET | Refills: 3 | Status: SHIPPED | OUTPATIENT
Start: 2025-07-02

## 2025-07-08 ENCOUNTER — TELEPHONE (OUTPATIENT)
Dept: GASTROENTEROLOGY | Facility: CLINIC | Age: 75
End: 2025-07-08
Payer: MEDICARE

## 2025-07-08 DIAGNOSIS — Z12.11 COLON CANCER SCREENING: Primary | ICD-10-CM

## 2025-07-08 RX ORDER — SODIUM, POTASSIUM,MAG SULFATES 17.5-3.13G
SOLUTION, RECONSTITUTED, ORAL ORAL
Qty: 354 ML | Refills: 0 | Status: SHIPPED | OUTPATIENT
Start: 2025-07-08

## 2025-08-18 ENCOUNTER — ANESTHESIA EVENT (OUTPATIENT)
Dept: GASTROENTEROLOGY | Facility: HOSPITAL | Age: 75
End: 2025-08-18
Payer: MEDICARE

## 2025-08-18 ENCOUNTER — ANESTHESIA (OUTPATIENT)
Dept: GASTROENTEROLOGY | Facility: HOSPITAL | Age: 75
End: 2025-08-18
Payer: MEDICARE

## 2025-08-18 ENCOUNTER — HOSPITAL ENCOUNTER (OUTPATIENT)
Dept: GASTROENTEROLOGY | Facility: HOSPITAL | Age: 75
Discharge: HOME | End: 2025-08-18
Payer: MEDICARE

## 2025-08-18 VITALS
DIASTOLIC BLOOD PRESSURE: 68 MMHG | HEART RATE: 80 BPM | WEIGHT: 145.5 LBS | SYSTOLIC BLOOD PRESSURE: 128 MMHG | BODY MASS INDEX: 28.57 KG/M2 | OXYGEN SATURATION: 97 % | RESPIRATION RATE: 16 BRPM | HEIGHT: 60 IN | TEMPERATURE: 97.3 F

## 2025-08-18 DIAGNOSIS — Z85.038 PERSONAL HISTORY OF OTHER MALIGNANT NEOPLASM OF LARGE INTESTINE: Primary | ICD-10-CM

## 2025-08-18 PROCEDURE — 7100000009 HC PHASE TWO TIME - INITIAL BASE CHARGE

## 2025-08-18 PROCEDURE — 7100000010 HC PHASE TWO TIME - EACH INCREMENTAL 1 MINUTE

## 2025-08-18 PROCEDURE — A45378 PR COLONOSCOPY,DIAGNOSTIC: Performed by: NURSE ANESTHETIST, CERTIFIED REGISTERED

## 2025-08-18 PROCEDURE — 3700000002 HC GENERAL ANESTHESIA TIME - EACH INCREMENTAL 1 MINUTE

## 2025-08-18 PROCEDURE — 99100 ANES PT EXTEME AGE<1 YR&>70: CPT | Performed by: ANESTHESIOLOGY

## 2025-08-18 PROCEDURE — 2500000004 HC RX 250 GENERAL PHARMACY W/ HCPCS (ALT 636 FOR OP/ED): Performed by: NURSE ANESTHETIST, CERTIFIED REGISTERED

## 2025-08-18 PROCEDURE — A45378 PR COLONOSCOPY,DIAGNOSTIC: Performed by: ANESTHESIOLOGY

## 2025-08-18 PROCEDURE — G0105 COLORECTAL SCRN; HI RISK IND: HCPCS | Performed by: INTERNAL MEDICINE

## 2025-08-18 PROCEDURE — 3700000001 HC GENERAL ANESTHESIA TIME - INITIAL BASE CHARGE

## 2025-08-18 RX ORDER — LIDOCAINE HCL/PF 100 MG/5ML
SYRINGE (ML) INTRAVENOUS AS NEEDED
Status: DISCONTINUED | OUTPATIENT
Start: 2025-08-18 | End: 2025-08-18

## 2025-08-18 RX ORDER — PROPOFOL 10 MG/ML
INJECTION, EMULSION INTRAVENOUS AS NEEDED
Status: DISCONTINUED | OUTPATIENT
Start: 2025-08-18 | End: 2025-08-18

## 2025-08-18 RX ADMIN — LIDOCAINE HYDROCHLORIDE 30 MG: 20 INJECTION, SOLUTION INTRAVENOUS at 09:49

## 2025-08-18 RX ADMIN — PROPOFOL 100 MG: 10 INJECTION, EMULSION INTRAVENOUS at 09:50

## 2025-08-18 RX ADMIN — PROPOFOL 100 MCG/KG/MIN: 10 INJECTION, EMULSION INTRAVENOUS at 09:51

## 2025-08-18 ASSESSMENT — PAIN - FUNCTIONAL ASSESSMENT
PAIN_FUNCTIONAL_ASSESSMENT: 0-10

## 2025-08-18 ASSESSMENT — PAIN SCALES - GENERAL
PAINLEVEL_OUTOF10: 0 - NO PAIN

## 2025-09-08 ENCOUNTER — APPOINTMENT (OUTPATIENT)
Dept: PRIMARY CARE | Facility: CLINIC | Age: 75
End: 2025-09-08
Payer: MEDICARE

## 2025-09-17 ENCOUNTER — APPOINTMENT (OUTPATIENT)
Dept: SURGERY | Facility: CLINIC | Age: 75
End: 2025-09-17
Payer: MEDICARE